# Patient Record
Sex: FEMALE | Race: WHITE | NOT HISPANIC OR LATINO | Employment: FULL TIME | URBAN - METROPOLITAN AREA
[De-identification: names, ages, dates, MRNs, and addresses within clinical notes are randomized per-mention and may not be internally consistent; named-entity substitution may affect disease eponyms.]

---

## 2023-05-24 LAB — COBALAMIN (VITAMIN B12) (PG/ML) IN SER/PLAS: 1280 PG/ML (ref 211–911)

## 2023-05-29 LAB — METHYLMALONIC ACID, S: 0.14 UMOL/L (ref 0–0.4)

## 2023-10-21 ENCOUNTER — APPOINTMENT (OUTPATIENT)
Dept: RADIOLOGY | Facility: CLINIC | Age: 63
End: 2023-10-21

## 2023-12-11 DIAGNOSIS — Z00.6 RESEARCH STUDY PATIENT: Primary | ICD-10-CM

## 2023-12-11 DIAGNOSIS — Z00.6 RESEARCH EXAM: ICD-10-CM

## 2023-12-15 ENCOUNTER — HOSPITAL ENCOUNTER (OUTPATIENT)
Dept: RADIOLOGY | Facility: HOSPITAL | Age: 63
Discharge: HOME | End: 2023-12-15

## 2023-12-15 ENCOUNTER — LAB (OUTPATIENT)
Dept: LAB | Facility: LAB | Age: 63
End: 2023-12-15
Payer: COMMERCIAL

## 2023-12-15 DIAGNOSIS — Z00.6 RESEARCH EXAM: ICD-10-CM

## 2023-12-15 DIAGNOSIS — Z00.6 ENCOUNTER FOR EXAMINATION FOR NORMAL COMPARISON AND CONTROL IN CLINICAL RESEARCH PROGRAM: ICD-10-CM

## 2023-12-15 DIAGNOSIS — Z00.6 RESEARCH STUDY PATIENT: ICD-10-CM

## 2023-12-15 LAB
BASOPHILS # BLD AUTO: 0.02 X10*3/UL (ref 0–0.1)
BASOPHILS NFR BLD AUTO: 0.4 %
CHOLEST SERPL-MCNC: 207 MG/DL (ref 0–199)
CHOLESTEROL/HDL RATIO: 3.2
CRP SERPL HS-MCNC: 25.4 MG/L
EOSINOPHIL # BLD AUTO: 0.08 X10*3/UL (ref 0–0.7)
EOSINOPHIL NFR BLD AUTO: 1.5 %
ERYTHROCYTE [DISTWIDTH] IN BLOOD BY AUTOMATED COUNT: 16.5 % (ref 11.5–14.5)
HCT VFR BLD AUTO: 35.6 % (ref 36–46)
HDLC SERPL-MCNC: 63.7 MG/DL
HGB BLD-MCNC: 10.5 G/DL (ref 12–16)
IMM GRANULOCYTES # BLD AUTO: 0.01 X10*3/UL (ref 0–0.7)
IMM GRANULOCYTES NFR BLD AUTO: 0.2 % (ref 0–0.9)
LDLC SERPL CALC-MCNC: 116 MG/DL
LYMPHOCYTES # BLD AUTO: 0.92 X10*3/UL (ref 1.2–4.8)
LYMPHOCYTES NFR BLD AUTO: 17.4 %
MCH RBC QN AUTO: 24.5 PG (ref 26–34)
MCHC RBC AUTO-ENTMCNC: 29.5 G/DL (ref 32–36)
MCV RBC AUTO: 83 FL (ref 80–100)
MONOCYTES # BLD AUTO: 0.62 X10*3/UL (ref 0.1–1)
MONOCYTES NFR BLD AUTO: 11.7 %
NEUTROPHILS # BLD AUTO: 3.64 X10*3/UL (ref 1.2–7.7)
NEUTROPHILS NFR BLD AUTO: 68.8 %
NON HDL CHOLESTEROL: 143 MG/DL (ref 0–149)
NRBC BLD-RTO: 0 /100 WBCS (ref 0–0)
PLATELET # BLD AUTO: 288 X10*3/UL (ref 150–450)
RBC # BLD AUTO: 4.28 X10*6/UL (ref 4–5.2)
TRIGL SERPL-MCNC: 137 MG/DL (ref 0–149)
VLDL: 27 MG/DL (ref 0–40)
WBC # BLD AUTO: 5.3 X10*3/UL (ref 4.4–11.3)

## 2023-12-15 PROCEDURE — 36415 COLL VENOUS BLD VENIPUNCTURE: CPT

## 2023-12-15 PROCEDURE — 80061 LIPID PANEL: CPT

## 2023-12-15 PROCEDURE — 86141 C-REACTIVE PROTEIN HS: CPT

## 2023-12-15 PROCEDURE — 82610 CYSTATIN C: CPT

## 2023-12-15 PROCEDURE — 85025 COMPLETE CBC W/AUTO DIFF WBC: CPT

## 2023-12-15 PROCEDURE — 71250 CT THORAX DX C-: CPT | Mod: LIO,ONBASE

## 2023-12-15 PROCEDURE — 71250 CT THORAX DX C-: CPT | Mod: LIO | Performed by: RADIOLOGY

## 2023-12-15 PROCEDURE — 70551 MRI BRAIN STEM W/O DYE: CPT | Mod: RSC

## 2023-12-18 LAB
CYSTATIN C SERPL-MCNC: 1.7 MG/L (ref 0.5–1.2)
GFR/BSA.PRED SERPLBLD CYS-BASED-ARV: 36 ML/MIN/BSA

## 2023-12-21 DIAGNOSIS — Z00.6 RESEARCH STUDY PATIENT: ICD-10-CM

## 2024-02-29 DIAGNOSIS — Z00.6 RESEARCH STUDY PATIENT: Primary | ICD-10-CM

## 2024-03-01 ENCOUNTER — APPOINTMENT (OUTPATIENT)
Dept: RADIOLOGY | Facility: HOSPITAL | Age: 64
End: 2024-03-01
Payer: COMMERCIAL

## 2024-03-01 ENCOUNTER — APPOINTMENT (OUTPATIENT)
Dept: OCCUPATIONAL THERAPY | Facility: HOSPITAL | Age: 64
End: 2024-03-01
Payer: COMMERCIAL

## 2024-03-01 ENCOUNTER — LAB (OUTPATIENT)
Dept: LAB | Facility: LAB | Age: 64
End: 2024-03-01
Payer: COMMERCIAL

## 2024-03-01 DIAGNOSIS — Z00.6 RESEARCH STUDY PATIENT: ICD-10-CM

## 2024-03-01 LAB
25(OH)D3 SERPL-MCNC: 36 NG/ML (ref 30–100)
ALBUMIN SERPL BCP-MCNC: 3.9 G/DL (ref 3.4–5)
ALP SERPL-CCNC: 75 U/L (ref 33–136)
ALT SERPL W P-5'-P-CCNC: 11 U/L (ref 7–45)
ANION GAP SERPL CALC-SCNC: 17 MMOL/L (ref 10–20)
APPEARANCE UR: CLEAR
APTT PPP: 33 SECONDS (ref 27–38)
AST SERPL W P-5'-P-CCNC: 13 U/L (ref 9–39)
BASOPHILS # BLD AUTO: 0.02 X10*3/UL (ref 0–0.1)
BASOPHILS NFR BLD AUTO: 0.3 %
BILIRUB DIRECT SERPL-MCNC: 0.1 MG/DL (ref 0–0.3)
BILIRUB SERPL-MCNC: 0.6 MG/DL (ref 0–1.2)
BILIRUB UR STRIP.AUTO-MCNC: NEGATIVE MG/DL
BNP SERPL-MCNC: 50 PG/ML (ref 0–99)
BUN SERPL-MCNC: 18 MG/DL (ref 6–23)
CALCIUM SERPL-MCNC: 9.4 MG/DL (ref 8.6–10.6)
CARDIAC TROPONIN I PNL SERPL HS: 3 NG/L (ref 0–34)
CHLORIDE SERPL-SCNC: 100 MMOL/L (ref 98–107)
CHOLEST SERPL-MCNC: 231 MG/DL (ref 0–199)
CHOLESTEROL/HDL RATIO: 3.1
CK SERPL-CCNC: 27 U/L (ref 0–215)
CO2 SERPL-SCNC: 30 MMOL/L (ref 21–32)
COLOR UR: COLORLESS
CREAT SERPL-MCNC: 0.96 MG/DL (ref 0.5–1.05)
CREAT UR-MCNC: 38.5 MG/DL (ref 20–320)
CRP SERPL HS-MCNC: 31.6 MG/L
EGFRCR SERPLBLD CKD-EPI 2021: 67 ML/MIN/1.73M*2
EOSINOPHIL # BLD AUTO: 0.01 X10*3/UL (ref 0–0.7)
EOSINOPHIL NFR BLD AUTO: 0.2 %
ERYTHROCYTE [DISTWIDTH] IN BLOOD BY AUTOMATED COUNT: 16.5 % (ref 11.5–14.5)
EST. AVERAGE GLUCOSE BLD GHB EST-MCNC: 105 MG/DL
GLUCOSE SERPL-MCNC: 76 MG/DL (ref 74–99)
GLUCOSE UR STRIP.AUTO-MCNC: NORMAL MG/DL
HBA1C MFR BLD: 5.3 %
HCT VFR BLD AUTO: 38.2 % (ref 36–46)
HDLC SERPL-MCNC: 74.4 MG/DL
HGB BLD-MCNC: 10.5 G/DL (ref 12–16)
IMM GRANULOCYTES # BLD AUTO: 0.03 X10*3/UL (ref 0–0.7)
IMM GRANULOCYTES NFR BLD AUTO: 0.5 % (ref 0–0.9)
INR PPP: 1 (ref 0.9–1.1)
KETONES UR STRIP.AUTO-MCNC: NEGATIVE MG/DL
LDLC SERPL CALC-MCNC: 141 MG/DL
LEUKOCYTE ESTERASE UR QL STRIP.AUTO: NEGATIVE
LYMPHOCYTES # BLD AUTO: 0.6 X10*3/UL (ref 1.2–4.8)
LYMPHOCYTES NFR BLD AUTO: 9.2 %
MAGNESIUM SERPL-MCNC: 2.22 MG/DL (ref 1.6–2.4)
MCH RBC QN AUTO: 23.6 PG (ref 26–34)
MCHC RBC AUTO-ENTMCNC: 27.5 G/DL (ref 32–36)
MCV RBC AUTO: 86 FL (ref 80–100)
MONOCYTES # BLD AUTO: 0.51 X10*3/UL (ref 0.1–1)
MONOCYTES NFR BLD AUTO: 7.8 %
NEUTROPHILS # BLD AUTO: 5.38 X10*3/UL (ref 1.2–7.7)
NEUTROPHILS NFR BLD AUTO: 82 %
NITRITE UR QL STRIP.AUTO: NEGATIVE
NON HDL CHOLESTEROL: 157 MG/DL (ref 0–149)
NRBC BLD-RTO: 0 /100 WBCS (ref 0–0)
PH UR STRIP.AUTO: 7 [PH]
PHOSPHATE SERPL-MCNC: 3 MG/DL (ref 2.5–4.9)
PLATELET # BLD AUTO: 344 X10*3/UL (ref 150–450)
POTASSIUM SERPL-SCNC: 4.2 MMOL/L (ref 3.5–5.3)
PROT SERPL-MCNC: 6.4 G/DL (ref 6.4–8.2)
PROT UR STRIP.AUTO-MCNC: NEGATIVE MG/DL
PROT UR-ACNC: <4 MG/DL (ref 5–24)
PROT/CREAT UR: ABNORMAL MG/G{CREAT}
PROTHROMBIN TIME: 11.5 SECONDS (ref 9.8–12.8)
RBC # BLD AUTO: 4.45 X10*6/UL (ref 4–5.2)
RBC # UR STRIP.AUTO: NEGATIVE /UL
SODIUM SERPL-SCNC: 143 MMOL/L (ref 136–145)
SP GR UR STRIP.AUTO: 1.01
T4 FREE SERPL-MCNC: 1.07 NG/DL (ref 0.78–1.48)
TRIGL SERPL-MCNC: 76 MG/DL (ref 0–149)
TSH SERPL-ACNC: 1.75 MIU/L (ref 0.44–3.98)
URATE SERPL-MCNC: 7.4 MG/DL (ref 2.3–6.7)
UROBILINOGEN UR STRIP.AUTO-MCNC: NORMAL MG/DL
VLDL: 15 MG/DL (ref 0–40)
WBC # BLD AUTO: 6.6 X10*3/UL (ref 4.4–11.3)

## 2024-03-01 PROCEDURE — 84156 ASSAY OF PROTEIN URINE: CPT

## 2024-03-01 PROCEDURE — 82570 ASSAY OF URINE CREATININE: CPT

## 2024-03-01 PROCEDURE — 84484 ASSAY OF TROPONIN QUANT: CPT

## 2024-03-01 PROCEDURE — 85610 PROTHROMBIN TIME: CPT

## 2024-03-01 PROCEDURE — 80053 COMPREHEN METABOLIC PANEL: CPT

## 2024-03-01 PROCEDURE — 82306 VITAMIN D 25 HYDROXY: CPT

## 2024-03-01 PROCEDURE — 81003 URINALYSIS AUTO W/O SCOPE: CPT

## 2024-03-01 PROCEDURE — 85025 COMPLETE CBC W/AUTO DIFF WBC: CPT

## 2024-03-01 PROCEDURE — 36415 COLL VENOUS BLD VENIPUNCTURE: CPT

## 2024-03-01 PROCEDURE — 82550 ASSAY OF CK (CPK): CPT

## 2024-03-01 PROCEDURE — 84439 ASSAY OF FREE THYROXINE: CPT

## 2024-03-01 PROCEDURE — 83735 ASSAY OF MAGNESIUM: CPT

## 2024-03-01 PROCEDURE — 85730 THROMBOPLASTIN TIME PARTIAL: CPT

## 2024-03-01 PROCEDURE — 82248 BILIRUBIN DIRECT: CPT

## 2024-03-01 PROCEDURE — 84550 ASSAY OF BLOOD/URIC ACID: CPT

## 2024-03-01 PROCEDURE — 80061 LIPID PANEL: CPT

## 2024-03-01 PROCEDURE — 84443 ASSAY THYROID STIM HORMONE: CPT

## 2024-03-01 PROCEDURE — 86141 C-REACTIVE PROTEIN HS: CPT

## 2024-03-01 PROCEDURE — 82610 CYSTATIN C: CPT

## 2024-03-01 PROCEDURE — 83036 HEMOGLOBIN GLYCOSYLATED A1C: CPT

## 2024-03-01 PROCEDURE — 83880 ASSAY OF NATRIURETIC PEPTIDE: CPT

## 2024-03-01 PROCEDURE — 84100 ASSAY OF PHOSPHORUS: CPT

## 2024-03-04 LAB
CYSTATIN C SERPL-MCNC: 1.8 MG/L (ref 0.5–1.2)
GFR/BSA.PRED SERPLBLD CYS-BASED-ARV: 33 ML/MIN/BSA

## 2024-06-27 ENCOUNTER — HOSPITAL ENCOUNTER (OUTPATIENT)
Dept: CARDIOLOGY | Facility: HOSPITAL | Age: 64
Discharge: HOME | End: 2024-06-27

## 2024-06-27 DIAGNOSIS — Z00.6 RESEARCH STUDY PATIENT: ICD-10-CM

## 2024-06-27 PROCEDURE — 93005 ELECTROCARDIOGRAM TRACING: CPT

## 2024-06-29 LAB
ATRIAL RATE: 70 BPM
P AXIS: 80 DEGREES
P OFFSET: 156 MS
P ONSET: 117 MS
PR INTERVAL: 184 MS
Q ONSET: 209 MS
QRS COUNT: 11 BEATS
QRS DURATION: 94 MS
QT INTERVAL: 426 MS
QTC CALCULATION(BAZETT): 460 MS
QTC FREDERICIA: 448 MS
R AXIS: -32 DEGREES
T AXIS: 28 DEGREES
T OFFSET: 422 MS
VENTRICULAR RATE: 70 BPM

## 2024-08-28 ENCOUNTER — HOSPITAL ENCOUNTER (EMERGENCY)
Facility: HOSPITAL | Age: 64
Discharge: HOME | End: 2024-08-28
Payer: COMMERCIAL

## 2024-08-28 ENCOUNTER — APPOINTMENT (OUTPATIENT)
Dept: RADIOLOGY | Facility: HOSPITAL | Age: 64
End: 2024-08-28
Payer: COMMERCIAL

## 2024-08-28 VITALS
DIASTOLIC BLOOD PRESSURE: 68 MMHG | HEART RATE: 70 BPM | WEIGHT: 293 LBS | RESPIRATION RATE: 18 BRPM | OXYGEN SATURATION: 98 % | BODY MASS INDEX: 50.02 KG/M2 | TEMPERATURE: 97.2 F | SYSTOLIC BLOOD PRESSURE: 132 MMHG | HEIGHT: 64 IN

## 2024-08-28 DIAGNOSIS — R11.0 NAUSEA: ICD-10-CM

## 2024-08-28 DIAGNOSIS — N20.0 KIDNEY STONE: Primary | ICD-10-CM

## 2024-08-28 DIAGNOSIS — N17.9 ACUTE KIDNEY INJURY (CMS-HCC): ICD-10-CM

## 2024-08-28 LAB
ALBUMIN SERPL BCP-MCNC: 3.7 G/DL (ref 3.4–5)
ALP SERPL-CCNC: 64 U/L (ref 33–136)
ALT SERPL W P-5'-P-CCNC: 12 U/L (ref 7–45)
ANION GAP SERPL CALC-SCNC: 14 MMOL/L (ref 10–20)
APPEARANCE UR: CLEAR
AST SERPL W P-5'-P-CCNC: 17 U/L (ref 9–39)
BACTERIA #/AREA URNS AUTO: ABNORMAL /HPF
BASOPHILS # BLD AUTO: 0.02 X10*3/UL (ref 0–0.1)
BASOPHILS NFR BLD AUTO: 0.2 %
BILIRUB SERPL-MCNC: 0.7 MG/DL (ref 0–1.2)
BILIRUB UR STRIP.AUTO-MCNC: NEGATIVE MG/DL
BUN SERPL-MCNC: 25 MG/DL (ref 6–23)
CALCIUM SERPL-MCNC: 9.4 MG/DL (ref 8.6–10.3)
CHLORIDE SERPL-SCNC: 101 MMOL/L (ref 98–107)
CO2 SERPL-SCNC: 28 MMOL/L (ref 21–32)
COLOR UR: ABNORMAL
CREAT SERPL-MCNC: 1.23 MG/DL (ref 0.5–1.05)
EGFRCR SERPLBLD CKD-EPI 2021: 50 ML/MIN/1.73M*2
EOSINOPHIL # BLD AUTO: 0.01 X10*3/UL (ref 0–0.7)
EOSINOPHIL NFR BLD AUTO: 0.1 %
ERYTHROCYTE [DISTWIDTH] IN BLOOD BY AUTOMATED COUNT: 16.7 % (ref 11.5–14.5)
GLUCOSE SERPL-MCNC: 105 MG/DL (ref 74–99)
GLUCOSE UR STRIP.AUTO-MCNC: NORMAL MG/DL
HCT VFR BLD AUTO: 37.2 % (ref 36–46)
HGB BLD-MCNC: 10.8 G/DL (ref 12–16)
IMM GRANULOCYTES # BLD AUTO: 0.07 X10*3/UL (ref 0–0.7)
IMM GRANULOCYTES NFR BLD AUTO: 0.6 % (ref 0–0.9)
KETONES UR STRIP.AUTO-MCNC: NEGATIVE MG/DL
LACTATE SERPL-SCNC: 1.9 MMOL/L (ref 0.4–2)
LEUKOCYTE ESTERASE UR QL STRIP.AUTO: ABNORMAL
LYMPHOCYTES # BLD AUTO: 0.67 X10*3/UL (ref 1.2–4.8)
LYMPHOCYTES NFR BLD AUTO: 6.1 %
MCH RBC QN AUTO: 23 PG (ref 26–34)
MCHC RBC AUTO-ENTMCNC: 29 G/DL (ref 32–36)
MCV RBC AUTO: 79 FL (ref 80–100)
MONOCYTES # BLD AUTO: 0.79 X10*3/UL (ref 0.1–1)
MONOCYTES NFR BLD AUTO: 7.2 %
MUCOUS THREADS #/AREA URNS AUTO: ABNORMAL /LPF
NEUTROPHILS # BLD AUTO: 9.36 X10*3/UL (ref 1.2–7.7)
NEUTROPHILS NFR BLD AUTO: 85.8 %
NITRITE UR QL STRIP.AUTO: NEGATIVE
NRBC BLD-RTO: 0 /100 WBCS (ref 0–0)
PH UR STRIP.AUTO: 5.5 [PH]
PLATELET # BLD AUTO: 347 X10*3/UL (ref 150–450)
POTASSIUM SERPL-SCNC: 3.8 MMOL/L (ref 3.5–5.3)
PROT SERPL-MCNC: 7.3 G/DL (ref 6.4–8.2)
PROT UR STRIP.AUTO-MCNC: NEGATIVE MG/DL
RBC # BLD AUTO: 4.69 X10*6/UL (ref 4–5.2)
RBC # UR STRIP.AUTO: ABNORMAL /UL
RBC #/AREA URNS AUTO: >20 /HPF
SODIUM SERPL-SCNC: 139 MMOL/L (ref 136–145)
SP GR UR STRIP.AUTO: 1.04
SQUAMOUS #/AREA URNS AUTO: ABNORMAL /HPF
UROBILINOGEN UR STRIP.AUTO-MCNC: NORMAL MG/DL
WBC # BLD AUTO: 10.9 X10*3/UL (ref 4.4–11.3)
WBC #/AREA URNS AUTO: ABNORMAL /HPF

## 2024-08-28 PROCEDURE — 96365 THER/PROPH/DIAG IV INF INIT: CPT | Mod: 59

## 2024-08-28 PROCEDURE — 74177 CT ABD & PELVIS W/CONTRAST: CPT

## 2024-08-28 PROCEDURE — 84075 ASSAY ALKALINE PHOSPHATASE: CPT

## 2024-08-28 PROCEDURE — 74177 CT ABD & PELVIS W/CONTRAST: CPT | Mod: FOREIGN READ | Performed by: RADIOLOGY

## 2024-08-28 PROCEDURE — 2500000004 HC RX 250 GENERAL PHARMACY W/ HCPCS (ALT 636 FOR OP/ED)

## 2024-08-28 PROCEDURE — 87086 URINE CULTURE/COLONY COUNT: CPT | Mod: ELYLAB

## 2024-08-28 PROCEDURE — 96361 HYDRATE IV INFUSION ADD-ON: CPT

## 2024-08-28 PROCEDURE — 85025 COMPLETE CBC W/AUTO DIFF WBC: CPT

## 2024-08-28 PROCEDURE — 83605 ASSAY OF LACTIC ACID: CPT

## 2024-08-28 PROCEDURE — 2550000001 HC RX 255 CONTRASTS

## 2024-08-28 PROCEDURE — 96376 TX/PRO/DX INJ SAME DRUG ADON: CPT

## 2024-08-28 PROCEDURE — 96375 TX/PRO/DX INJ NEW DRUG ADDON: CPT

## 2024-08-28 PROCEDURE — 99284 EMERGENCY DEPT VISIT MOD MDM: CPT | Mod: 25

## 2024-08-28 PROCEDURE — 81001 URINALYSIS AUTO W/SCOPE: CPT

## 2024-08-28 RX ORDER — MORPHINE SULFATE 4 MG/ML
4 INJECTION, SOLUTION INTRAMUSCULAR; INTRAVENOUS ONCE
Status: COMPLETED | OUTPATIENT
Start: 2024-08-28 | End: 2024-08-28

## 2024-08-28 RX ORDER — KETOROLAC TROMETHAMINE 30 MG/ML
30 INJECTION, SOLUTION INTRAMUSCULAR; INTRAVENOUS ONCE
Status: COMPLETED | OUTPATIENT
Start: 2024-08-28 | End: 2024-08-28

## 2024-08-28 RX ORDER — ONDANSETRON 4 MG/1
4 TABLET, ORALLY DISINTEGRATING ORAL EVERY 8 HOURS PRN
Qty: 20 TABLET | Refills: 0 | Status: SHIPPED | OUTPATIENT
Start: 2024-08-28 | End: 2024-09-04

## 2024-08-28 RX ORDER — CEPHALEXIN 500 MG/1
500 CAPSULE ORAL 2 TIMES DAILY
Qty: 14 CAPSULE | Refills: 0 | Status: SHIPPED | OUTPATIENT
Start: 2024-08-28 | End: 2024-09-04

## 2024-08-28 RX ORDER — HYDROCODONE BITARTRATE AND ACETAMINOPHEN 5; 325 MG/1; MG/1
1 TABLET ORAL EVERY 6 HOURS PRN
Qty: 20 TABLET | Refills: 0 | Status: SHIPPED | OUTPATIENT
Start: 2024-08-28 | End: 2024-09-02 | Stop reason: HOSPADM

## 2024-08-28 RX ORDER — ONDANSETRON HYDROCHLORIDE 2 MG/ML
4 INJECTION, SOLUTION INTRAVENOUS ONCE
Status: COMPLETED | OUTPATIENT
Start: 2024-08-28 | End: 2024-08-28

## 2024-08-28 RX ORDER — TAMSULOSIN HYDROCHLORIDE 0.4 MG/1
0.4 CAPSULE ORAL DAILY
Qty: 7 CAPSULE | Refills: 0 | Status: SHIPPED | OUTPATIENT
Start: 2024-08-28 | End: 2024-09-04

## 2024-08-28 ASSESSMENT — COLUMBIA-SUICIDE SEVERITY RATING SCALE - C-SSRS
1. IN THE PAST MONTH, HAVE YOU WISHED YOU WERE DEAD OR WISHED YOU COULD GO TO SLEEP AND NOT WAKE UP?: NO
2. HAVE YOU ACTUALLY HAD ANY THOUGHTS OF KILLING YOURSELF?: NO
6. HAVE YOU EVER DONE ANYTHING, STARTED TO DO ANYTHING, OR PREPARED TO DO ANYTHING TO END YOUR LIFE?: NO

## 2024-08-28 ASSESSMENT — LIFESTYLE VARIABLES
TOTAL SCORE: 0
HAVE PEOPLE ANNOYED YOU BY CRITICIZING YOUR DRINKING: NO
EVER FELT BAD OR GUILTY ABOUT YOUR DRINKING: NO
HAVE YOU EVER FELT YOU SHOULD CUT DOWN ON YOUR DRINKING: NO
EVER HAD A DRINK FIRST THING IN THE MORNING TO STEADY YOUR NERVES TO GET RID OF A HANGOVER: NO

## 2024-08-28 ASSESSMENT — PAIN DESCRIPTION - FREQUENCY: FREQUENCY: CONSTANT/CONTINUOUS

## 2024-08-28 ASSESSMENT — PAIN SCALES - GENERAL
PAINLEVEL_OUTOF10: 8
PAINLEVEL_OUTOF10: 5 - MODERATE PAIN

## 2024-08-28 ASSESSMENT — PAIN DESCRIPTION - LOCATION
LOCATION: ABDOMEN
LOCATION: ABDOMEN
LOCATION: BACK
LOCATION: ABDOMEN

## 2024-08-28 ASSESSMENT — PAIN DESCRIPTION - PAIN TYPE: TYPE: ACUTE PAIN

## 2024-08-28 ASSESSMENT — PAIN - FUNCTIONAL ASSESSMENT: PAIN_FUNCTIONAL_ASSESSMENT: 0-10

## 2024-08-28 ASSESSMENT — PAIN DESCRIPTION - ORIENTATION: ORIENTATION: LEFT;LOWER

## 2024-08-28 ASSESSMENT — PAIN DESCRIPTION - DESCRIPTORS: DESCRIPTORS: BURNING;ACHING

## 2024-08-28 NOTE — ED PROVIDER NOTES
"HPI   Chief Complaint   Patient presents with    Abdominal Pain     Left lower abdominal pain x 3 days.       History provided by: Patient    Limitations to history: None    CC: Abdominal pain    HPI: 63-year-old female with a history of rheumatoid arthritis, fibromyalgia, GERD, [place]'s esophagitis presents emergency department to be evaluated for abdominal pain.  Patient states the abdominal pain started yesterday.  She characterized the pain as \"sharp and dull\" and localizes it to the left flank and states that it goes to her left side of her abdomen.  She denies take anything for pain prior to arrival.  Says it feels similar to last time she had kidney stone.  Reports nausea but no vomiting.  Denies urgency frequency dysuria.  Denies blood in the urine or stool but denies diarrhea or constipation.  Denies upper back pain, chest pain, shortness of breath, pleuritic pain or hemoptysis.  She denies history of heart or lung disease including ACS, arrhythmias, heart failure, DVT or PE, asthma or COPD.  Denies weakness and fatigue.  Denies fever and chills.  Denies any injury that would cause her discomfort.  Denies anesthesia, urinary tension, stool incontinence.  Denies history of IV drug use.  Nuys numbness tingling weakness in extremities.  Denies all other systemic symptoms.    ROS: Negative unless mentioned in HPI    Social Hx: Denies tobacco, alcohol, drug use    Medical Hx: Allergy list reviewed.  Immunizations up-to-date.    Physical exam:    Constitutional: Patient is obese and well-developed.  Appears to be uncomfortable but nontoxic in appearance.  Oriented to person, place, time, and situation.    HEENT: Head is normocephalic, atraumatic. Patient's airway is patent.  Tympanic membranes are clear bilaterally.  Nasal mucosa clear.  Mouth with normal mucosa.  Throat is not erythematous and there are no oropharyngeal exudates, uvula is midline.  No obvious facial deformities.    Eyes: Clear bilaterally.  " Pupils are equal round and reactive to light and accommodation.  Extraocular movements intact.      Cardiac: Regular rate, regular rhythm.  Heart sounds S1, S2.  No murmurs, rubs, or gallops.  PMI nondisplaced.  No JVD.    Respiratory: Regular respiratory rate and effort.  Breath sounds are clear and equal bilaterally, no adventitious lung sounds.  Patient is speaking in full sentences and is in no apparent respiratory distress. No use of accessory muscles.      Gastrointestinal: Left-sided abdominal tenderness to palpation.  Abdomen is soft and nondistended.  There are no obvious deformities.  No rebound tenderness or guarding.  Bowel sounds are normal active.    Genitourinary: Left CVA tenderness    Musculoskeletal: No reproducible tenderness.  No obvious skin or bony deformities.  Patient has equal range of motion in all extremities and no strength deficiencies.  No muscle or joint tenderness. No back or neck tenderness.  Capillary refill less than 3 seconds.  Strong peripheral pulses.  No sensory deficits.    Neurological: Patient is alert and oriented.  No focal deficits.  5/5 strength in all extremities.  Cranial nerves II through XII intact. GCS15.     Skin: Skin is normal color for race and is warm, dry, and intact.  No evidence of trauma.  No lesions, rashes, bruising, jaundice, or masses.    Psych: Appropriate mood and affect.  No apparent risk to self or others.    Heme/lymph: No adenopathy, lymphadenopathy, or splenomegaly    Physical exam is otherwise negative unless stated above or in history of present illness.              Patient History   Past Medical History:   Diagnosis Date    Arthritis     COVID-19 06/17/2022    COVID-19 virus infection    GERD (gastroesophageal reflux disease)     Paroxysmal atrial fibrillation (Multi)     Paroxysmal atrial fibrillation    RA (rheumatoid arthritis) (Multi)      Past Surgical History:   Procedure Laterality Date    OTHER SURGICAL HISTORY  06/17/2022     Cholecystectomy    OTHER SURGICAL HISTORY  06/17/2022    Colposcopy    OTHER SURGICAL HISTORY  06/17/2022    Tubal ligation bilateral    OTHER SURGICAL HISTORY  06/17/2022    Complete colonoscopy    OTHER SURGICAL HISTORY  09/27/2022    Bariatric surgery    OTHER SURGICAL HISTORY  09/27/2022    Esophagogastroduodenoscopy     No family history on file.  Social History     Tobacco Use    Smoking status: Never     Passive exposure: Never    Smokeless tobacco: Never   Vaping Use    Vaping status: Never Used   Substance Use Topics    Alcohol use: Defer    Drug use: Never       Physical Exam   ED Triage Vitals [08/28/24 1649]   Temperature Heart Rate Respirations BP   36.2 °C (97.2 °F) 86 20 (!) 194/91      Pulse Ox Temp Source Heart Rate Source Patient Position   97 % Temporal Monitor Sitting      BP Location FiO2 (%)     Right arm --       Physical Exam      ED Course & MDM   Diagnoses as of 08/28/24 2034   Kidney stone   Nausea   Acute kidney injury (CMS-HCC)          Patient updated on plan for lab testing, IV insertion, radiology imaging, and medications to be administered while in the ER (if indicated). Patient updated on expected wait times for testing and results. Patient provided my name and told to ask any staff member for questions or concerns if they should arise. Electronic medical record reviewed.     MDM    Upon initial assessment, patient obese and appears uncomfortable but nontoxic in appearance.    Patient presented to the emergency department with the chief complaint left flank pain.  Left CVA tenderness on exam.  Abdomen is soft and nondistended but she does have reproducible tenderness in the left side.  Breath sounds are clear bilaterally, 97% on room air.  No muffled sounds, JVD, or murmur.  On arrival to the emergency department, vital signs were significant for tension likely due to her acute discomfort.    Based on her history and physical exam my suspicion for ACS, AAA, PE are low.    Will  obtain basic blood work, urinalysis, lactate, CT abdomen pelvis with contrast.  Give the patient Toradol, morphine, Zofran for pain and nausea as well as fluids    CT does confirm a 4 mm mildly obstructing stone in the left proximal ureter with mild hydronephrosis.  Urinalysis reveals blood with 500 leukocyte esterase and 11-20 white blood cells.  There is 1+ bacteria but no nitrates.  CMP reveals a mild acute kidney injury the creatinine of 1.23.  This could be related to the stone however also could be related to some dehydration.  Lactate is 1.9 and CBC shows a microcytic anemia similar to baseline.  I did confirm with Dr. Sherman with urology that this would not be considered an infected stone and that the patient can be safely discharged home as long as she is feeling well and her pain and nausea under control.  Patient does feel comfortable going home at this time.  She will be discharged with Flomax, Norco, Zofran, and Keflex after receiving IV Rocephin here in the emergency department.  I did let her know that I do not want her taking NSAIDs until her kidney function is rechecked by her PCP.  She will follow-up with urology as well.  All questions and concerns addressed.  Reasons to return to ER discussed including fever and chills, worsening pain or nausea.  Patient verbalized understanding and agreement with the treatment plan and they remained hemodynamically stable in the ER.    This note was dictated using a speech recognition program.  While an attempt was made at proof-reading to minimize errors, minor errors in transcription may be present       No data recorded     [place] Coma Scale Score: 15 (08/28/24 1651 : Alok Dubon RN)                           Medical Decision Making      Procedure  Procedures     Hawk Valdez PA-C  08/28/24 2035

## 2024-08-29 LAB — HOLD SPECIMEN: NORMAL

## 2024-08-30 LAB — BACTERIA UR CULT: NORMAL

## 2024-09-01 ENCOUNTER — HOSPITAL ENCOUNTER (OUTPATIENT)
Facility: HOSPITAL | Age: 64
Setting detail: OBSERVATION
End: 2024-09-01
Attending: STUDENT IN AN ORGANIZED HEALTH CARE EDUCATION/TRAINING PROGRAM | Admitting: HOSPITALIST
Payer: COMMERCIAL

## 2024-09-01 ENCOUNTER — APPOINTMENT (OUTPATIENT)
Dept: RADIOLOGY | Facility: HOSPITAL | Age: 64
End: 2024-09-01
Payer: COMMERCIAL

## 2024-09-01 VITALS
DIASTOLIC BLOOD PRESSURE: 59 MMHG | TEMPERATURE: 97.9 F | BODY MASS INDEX: 50.02 KG/M2 | WEIGHT: 293 LBS | HEIGHT: 64 IN | HEART RATE: 84 BPM | OXYGEN SATURATION: 93 % | RESPIRATION RATE: 17 BRPM | SYSTOLIC BLOOD PRESSURE: 130 MMHG

## 2024-09-01 DIAGNOSIS — N20.0 KIDNEY STONES: Primary | ICD-10-CM

## 2024-09-01 LAB
ALBUMIN SERPL BCP-MCNC: 3.6 G/DL (ref 3.4–5)
ALP SERPL-CCNC: 66 U/L (ref 33–136)
ALT SERPL W P-5'-P-CCNC: 14 U/L (ref 7–45)
ANION GAP SERPL CALC-SCNC: 13 MMOL/L (ref 10–20)
APPEARANCE UR: CLEAR
AST SERPL W P-5'-P-CCNC: 15 U/L (ref 9–39)
BASOPHILS # BLD AUTO: 0.03 X10*3/UL (ref 0–0.1)
BASOPHILS NFR BLD AUTO: 0.3 %
BILIRUB SERPL-MCNC: 0.7 MG/DL (ref 0–1.2)
BILIRUB UR STRIP.AUTO-MCNC: NEGATIVE MG/DL
BUN SERPL-MCNC: 22 MG/DL (ref 6–23)
CALCIUM SERPL-MCNC: 9.1 MG/DL (ref 8.6–10.3)
CHLORIDE SERPL-SCNC: 96 MMOL/L (ref 98–107)
CO2 SERPL-SCNC: 30 MMOL/L (ref 21–32)
COLOR UR: YELLOW
CREAT SERPL-MCNC: 2.26 MG/DL (ref 0.5–1.05)
EGFRCR SERPLBLD CKD-EPI 2021: 24 ML/MIN/1.73M*2
EOSINOPHIL # BLD AUTO: 0.08 X10*3/UL (ref 0–0.7)
EOSINOPHIL NFR BLD AUTO: 0.8 %
ERYTHROCYTE [DISTWIDTH] IN BLOOD BY AUTOMATED COUNT: 16.6 % (ref 11.5–14.5)
GLUCOSE SERPL-MCNC: 114 MG/DL (ref 74–99)
GLUCOSE UR STRIP.AUTO-MCNC: NORMAL MG/DL
HCT VFR BLD AUTO: 32.4 % (ref 36–46)
HGB BLD-MCNC: 9.5 G/DL (ref 12–16)
HOLD SPECIMEN: NORMAL
IMM GRANULOCYTES # BLD AUTO: 0.06 X10*3/UL (ref 0–0.7)
IMM GRANULOCYTES NFR BLD AUTO: 0.6 % (ref 0–0.9)
KETONES UR STRIP.AUTO-MCNC: NEGATIVE MG/DL
LEUKOCYTE ESTERASE UR QL STRIP.AUTO: NEGATIVE
LYMPHOCYTES # BLD AUTO: 0.59 X10*3/UL (ref 1.2–4.8)
LYMPHOCYTES NFR BLD AUTO: 6 %
MCH RBC QN AUTO: 23.4 PG (ref 26–34)
MCHC RBC AUTO-ENTMCNC: 29.3 G/DL (ref 32–36)
MCV RBC AUTO: 80 FL (ref 80–100)
MONOCYTES # BLD AUTO: 1.26 X10*3/UL (ref 0.1–1)
MONOCYTES NFR BLD AUTO: 12.8 %
NEUTROPHILS # BLD AUTO: 7.82 X10*3/UL (ref 1.2–7.7)
NEUTROPHILS NFR BLD AUTO: 79.5 %
NITRITE UR QL STRIP.AUTO: NEGATIVE
NRBC BLD-RTO: 0 /100 WBCS (ref 0–0)
PH UR STRIP.AUTO: 5.5 [PH]
PLATELET # BLD AUTO: 290 X10*3/UL (ref 150–450)
POTASSIUM SERPL-SCNC: 3.4 MMOL/L (ref 3.5–5.3)
PROT SERPL-MCNC: 7.4 G/DL (ref 6.4–8.2)
PROT UR STRIP.AUTO-MCNC: NORMAL MG/DL
RBC # BLD AUTO: 4.06 X10*6/UL (ref 4–5.2)
RBC # UR STRIP.AUTO: NEGATIVE /UL
RBC #/AREA URNS AUTO: NORMAL /HPF
SODIUM SERPL-SCNC: 136 MMOL/L (ref 136–145)
SP GR UR STRIP.AUTO: 1.02
SQUAMOUS #/AREA URNS AUTO: NORMAL /HPF
UROBILINOGEN UR STRIP.AUTO-MCNC: NORMAL MG/DL
WBC # BLD AUTO: 9.8 X10*3/UL (ref 4.4–11.3)
WBC #/AREA URNS AUTO: NORMAL /HPF

## 2024-09-01 PROCEDURE — 96375 TX/PRO/DX INJ NEW DRUG ADDON: CPT

## 2024-09-01 PROCEDURE — 74176 CT ABD & PELVIS W/O CONTRAST: CPT | Mod: FOREIGN READ | Performed by: RADIOLOGY

## 2024-09-01 PROCEDURE — 36415 COLL VENOUS BLD VENIPUNCTURE: CPT | Performed by: STUDENT IN AN ORGANIZED HEALTH CARE EDUCATION/TRAINING PROGRAM

## 2024-09-01 PROCEDURE — 96372 THER/PROPH/DIAG INJ SC/IM: CPT | Performed by: HOSPITALIST

## 2024-09-01 PROCEDURE — 96365 THER/PROPH/DIAG IV INF INIT: CPT | Mod: 59

## 2024-09-01 PROCEDURE — 96361 HYDRATE IV INFUSION ADD-ON: CPT

## 2024-09-01 PROCEDURE — 96376 TX/PRO/DX INJ SAME DRUG ADON: CPT

## 2024-09-01 PROCEDURE — 2500000004 HC RX 250 GENERAL PHARMACY W/ HCPCS (ALT 636 FOR OP/ED): Performed by: STUDENT IN AN ORGANIZED HEALTH CARE EDUCATION/TRAINING PROGRAM

## 2024-09-01 PROCEDURE — 99223 1ST HOSP IP/OBS HIGH 75: CPT | Performed by: HOSPITALIST

## 2024-09-01 PROCEDURE — 84075 ASSAY ALKALINE PHOSPHATASE: CPT | Performed by: STUDENT IN AN ORGANIZED HEALTH CARE EDUCATION/TRAINING PROGRAM

## 2024-09-01 PROCEDURE — 96374 THER/PROPH/DIAG INJ IV PUSH: CPT | Mod: 59

## 2024-09-01 PROCEDURE — G0378 HOSPITAL OBSERVATION PER HR: HCPCS

## 2024-09-01 PROCEDURE — 74176 CT ABD & PELVIS W/O CONTRAST: CPT

## 2024-09-01 PROCEDURE — 2500000004 HC RX 250 GENERAL PHARMACY W/ HCPCS (ALT 636 FOR OP/ED): Performed by: HOSPITALIST

## 2024-09-01 PROCEDURE — 85025 COMPLETE CBC W/AUTO DIFF WBC: CPT | Performed by: STUDENT IN AN ORGANIZED HEALTH CARE EDUCATION/TRAINING PROGRAM

## 2024-09-01 PROCEDURE — 2500000002 HC RX 250 W HCPCS SELF ADMINISTERED DRUGS (ALT 637 FOR MEDICARE OP, ALT 636 FOR OP/ED): Performed by: HOSPITALIST

## 2024-09-01 PROCEDURE — 81001 URINALYSIS AUTO W/SCOPE: CPT | Performed by: STUDENT IN AN ORGANIZED HEALTH CARE EDUCATION/TRAINING PROGRAM

## 2024-09-01 PROCEDURE — 99285 EMERGENCY DEPT VISIT HI MDM: CPT

## 2024-09-01 PROCEDURE — 2500000001 HC RX 250 WO HCPCS SELF ADMINISTERED DRUGS (ALT 637 FOR MEDICARE OP): Performed by: STUDENT IN AN ORGANIZED HEALTH CARE EDUCATION/TRAINING PROGRAM

## 2024-09-01 RX ORDER — PANTOPRAZOLE SODIUM 20 MG/1
40 TABLET, DELAYED RELEASE ORAL 2 TIMES DAILY
COMMUNITY

## 2024-09-01 RX ORDER — CEFTRIAXONE 1 G/50ML
1 INJECTION, SOLUTION INTRAVENOUS EVERY 24 HOURS
Status: DISCONTINUED | OUTPATIENT
Start: 2024-09-01 | End: 2024-09-02 | Stop reason: HOSPADM

## 2024-09-01 RX ORDER — MORPHINE SULFATE 2 MG/ML
2 INJECTION, SOLUTION INTRAMUSCULAR; INTRAVENOUS EVERY 4 HOURS PRN
Status: DISCONTINUED | OUTPATIENT
Start: 2024-09-01 | End: 2024-09-02 | Stop reason: HOSPADM

## 2024-09-01 RX ORDER — ONDANSETRON HYDROCHLORIDE 2 MG/ML
4 INJECTION, SOLUTION INTRAVENOUS ONCE
Status: COMPLETED | OUTPATIENT
Start: 2024-09-01 | End: 2024-09-01

## 2024-09-01 RX ORDER — ENOXAPARIN SODIUM 100 MG/ML
60 INJECTION SUBCUTANEOUS EVERY 12 HOURS SCHEDULED
Status: DISCONTINUED | OUTPATIENT
Start: 2024-09-01 | End: 2024-09-02 | Stop reason: HOSPADM

## 2024-09-01 RX ORDER — FUROSEMIDE 20 MG/1
20 TABLET ORAL DAILY
COMMUNITY

## 2024-09-01 RX ORDER — PREDNISONE 10 MG/1
10 TABLET ORAL DAILY
Status: DISCONTINUED | OUTPATIENT
Start: 2024-09-02 | End: 2024-09-02 | Stop reason: HOSPADM

## 2024-09-01 RX ORDER — PREDNISONE 10 MG/1
10 TABLET ORAL DAILY
COMMUNITY

## 2024-09-01 RX ORDER — TAMSULOSIN HYDROCHLORIDE 0.4 MG/1
0.4 CAPSULE ORAL DAILY
Status: DISCONTINUED | OUTPATIENT
Start: 2024-09-01 | End: 2024-09-02 | Stop reason: HOSPADM

## 2024-09-01 RX ORDER — PANTOPRAZOLE SODIUM 40 MG/1
40 TABLET, DELAYED RELEASE ORAL 2 TIMES DAILY
Status: DISCONTINUED | OUTPATIENT
Start: 2024-09-01 | End: 2024-09-02 | Stop reason: HOSPADM

## 2024-09-01 RX ORDER — ACETAMINOPHEN 500 MG
5000 TABLET ORAL DAILY
COMMUNITY

## 2024-09-01 RX ORDER — CHOLECALCIFEROL (VITAMIN D3) 25 MCG
5000 TABLET ORAL DAILY
Status: DISCONTINUED | OUTPATIENT
Start: 2024-09-02 | End: 2024-09-02 | Stop reason: HOSPADM

## 2024-09-01 RX ORDER — FUROSEMIDE 40 MG/1
20 TABLET ORAL DAILY
Status: DISCONTINUED | OUTPATIENT
Start: 2024-09-02 | End: 2024-09-02 | Stop reason: HOSPADM

## 2024-09-01 RX ORDER — MORPHINE SULFATE 4 MG/ML
4 INJECTION, SOLUTION INTRAMUSCULAR; INTRAVENOUS ONCE
Status: COMPLETED | OUTPATIENT
Start: 2024-09-01 | End: 2024-09-01

## 2024-09-01 RX ADMIN — SODIUM CHLORIDE 500 ML: 9 INJECTION, SOLUTION INTRAVENOUS at 04:46

## 2024-09-01 RX ADMIN — HYDROMORPHONE HYDROCHLORIDE 0.5 MG: 1 INJECTION, SOLUTION INTRAMUSCULAR; INTRAVENOUS; SUBCUTANEOUS at 04:47

## 2024-09-01 RX ADMIN — MORPHINE SULFATE 2 MG: 2 INJECTION, SOLUTION INTRAMUSCULAR; INTRAVENOUS at 13:43

## 2024-09-01 RX ADMIN — MORPHINE SULFATE 4 MG: 4 INJECTION, SOLUTION INTRAMUSCULAR; INTRAVENOUS at 07:42

## 2024-09-01 RX ADMIN — ENOXAPARIN SODIUM 60 MG: 60 INJECTION SUBCUTANEOUS at 13:43

## 2024-09-01 RX ADMIN — MORPHINE SULFATE 2 MG: 2 INJECTION, SOLUTION INTRAMUSCULAR; INTRAVENOUS at 22:23

## 2024-09-01 RX ADMIN — MORPHINE SULFATE 2 MG: 2 INJECTION, SOLUTION INTRAMUSCULAR; INTRAVENOUS at 18:14

## 2024-09-01 RX ADMIN — ONDANSETRON 4 MG: 2 INJECTION INTRAMUSCULAR; INTRAVENOUS at 04:47

## 2024-09-01 RX ADMIN — PANTOPRAZOLE SODIUM 40 MG: 40 TABLET, DELAYED RELEASE ORAL at 22:23

## 2024-09-01 RX ADMIN — CEFTRIAXONE SODIUM 1 G: 1 INJECTION, SOLUTION INTRAVENOUS at 14:55

## 2024-09-01 RX ADMIN — ONDANSETRON 4 MG: 2 INJECTION INTRAMUSCULAR; INTRAVENOUS at 07:38

## 2024-09-01 RX ADMIN — TAMSULOSIN HYDROCHLORIDE 0.4 MG: 0.4 CAPSULE ORAL at 13:43

## 2024-09-01 SDOH — SOCIAL STABILITY: SOCIAL INSECURITY: DO YOU FEEL ANYONE HAS EXPLOITED OR TAKEN ADVANTAGE OF YOU FINANCIALLY OR OF YOUR PERSONAL PROPERTY?: NO

## 2024-09-01 SDOH — ECONOMIC STABILITY: HOUSING INSECURITY: AT ANY TIME IN THE PAST 12 MONTHS, WERE YOU HOMELESS OR LIVING IN A SHELTER (INCLUDING NOW)?: NO

## 2024-09-01 SDOH — HEALTH STABILITY: MENTAL HEALTH
HOW OFTEN DO YOU NEED TO HAVE SOMEONE HELP YOU WHEN YOU READ INSTRUCTIONS, PAMPHLETS, OR OTHER WRITTEN MATERIAL FROM YOUR DOCTOR OR PHARMACY?: NEVER

## 2024-09-01 SDOH — HEALTH STABILITY: PHYSICAL HEALTH: ON AVERAGE, HOW MANY MINUTES DO YOU ENGAGE IN EXERCISE AT THIS LEVEL?: 30 MIN

## 2024-09-01 SDOH — HEALTH STABILITY: MENTAL HEALTH: HOW OFTEN DO YOU HAVE 6 OR MORE DRINKS ON ONE OCCASION?: NEVER

## 2024-09-01 SDOH — HEALTH STABILITY: MENTAL HEALTH: HOW OFTEN DO YOU HAVE A DRINK CONTAINING ALCOHOL?: NEVER

## 2024-09-01 SDOH — ECONOMIC STABILITY: HOUSING INSECURITY: IN THE PAST 12 MONTHS, HOW MANY TIMES HAVE YOU MOVED WHERE YOU WERE LIVING?: 1

## 2024-09-01 SDOH — ECONOMIC STABILITY: INCOME INSECURITY: IN THE PAST 12 MONTHS, HAS THE ELECTRIC, GAS, OIL, OR WATER COMPANY THREATENED TO SHUT OFF SERVICE IN YOUR HOME?: NO

## 2024-09-01 SDOH — ECONOMIC STABILITY: INCOME INSECURITY: HOW HARD IS IT FOR YOU TO PAY FOR THE VERY BASICS LIKE FOOD, HOUSING, MEDICAL CARE, AND HEATING?: NOT HARD AT ALL

## 2024-09-01 SDOH — SOCIAL STABILITY: SOCIAL INSECURITY: HAVE YOU HAD ANY THOUGHTS OF HARMING ANYONE ELSE?: NO

## 2024-09-01 SDOH — ECONOMIC STABILITY: INCOME INSECURITY: IN THE LAST 12 MONTHS, WAS THERE A TIME WHEN YOU WERE NOT ABLE TO PAY THE MORTGAGE OR RENT ON TIME?: NO

## 2024-09-01 SDOH — SOCIAL STABILITY: SOCIAL INSECURITY: HAVE YOU HAD THOUGHTS OF HARMING ANYONE ELSE?: NO

## 2024-09-01 SDOH — SOCIAL STABILITY: SOCIAL INSECURITY: DO YOU FEEL UNSAFE GOING BACK TO THE PLACE WHERE YOU ARE LIVING?: NO

## 2024-09-01 SDOH — SOCIAL STABILITY: SOCIAL INSECURITY: WERE YOU ABLE TO COMPLETE ALL THE BEHAVIORAL HEALTH SCREENINGS?: YES

## 2024-09-01 SDOH — HEALTH STABILITY: MENTAL HEALTH: HOW MANY STANDARD DRINKS CONTAINING ALCOHOL DO YOU HAVE ON A TYPICAL DAY?: PATIENT DOES NOT DRINK

## 2024-09-01 SDOH — SOCIAL STABILITY: SOCIAL INSECURITY: ARE YOU OR HAVE YOU BEEN THREATENED OR ABUSED PHYSICALLY, EMOTIONALLY, OR SEXUALLY BY ANYONE?: NO

## 2024-09-01 SDOH — SOCIAL STABILITY: SOCIAL INSECURITY: ARE THERE ANY APPARENT SIGNS OF INJURIES/BEHAVIORS THAT COULD BE RELATED TO ABUSE/NEGLECT?: NO

## 2024-09-01 SDOH — HEALTH STABILITY: PHYSICAL HEALTH: ON AVERAGE, HOW MANY DAYS PER WEEK DO YOU ENGAGE IN MODERATE TO STRENUOUS EXERCISE (LIKE A BRISK WALK)?: 4 DAYS

## 2024-09-01 SDOH — SOCIAL STABILITY: SOCIAL INSECURITY: DOES ANYONE TRY TO KEEP YOU FROM HAVING/CONTACTING OTHER FRIENDS OR DOING THINGS OUTSIDE YOUR HOME?: NO

## 2024-09-01 SDOH — ECONOMIC STABILITY: TRANSPORTATION INSECURITY
IN THE PAST 12 MONTHS, HAS LACK OF TRANSPORTATION KEPT YOU FROM MEETINGS, WORK, OR FROM GETTING THINGS NEEDED FOR DAILY LIVING?: NO

## 2024-09-01 SDOH — ECONOMIC STABILITY: TRANSPORTATION INSECURITY
IN THE PAST 12 MONTHS, HAS THE LACK OF TRANSPORTATION KEPT YOU FROM MEDICAL APPOINTMENTS OR FROM GETTING MEDICATIONS?: NO

## 2024-09-01 SDOH — SOCIAL STABILITY: SOCIAL INSECURITY: HAS ANYONE EVER THREATENED TO HURT YOUR FAMILY OR YOUR PETS?: NO

## 2024-09-01 SDOH — SOCIAL STABILITY: SOCIAL INSECURITY: ABUSE: ADULT

## 2024-09-01 ASSESSMENT — PATIENT HEALTH QUESTIONNAIRE - PHQ9
2. FEELING DOWN, DEPRESSED OR HOPELESS: NOT AT ALL
1. LITTLE INTEREST OR PLEASURE IN DOING THINGS: NOT AT ALL
SUM OF ALL RESPONSES TO PHQ9 QUESTIONS 1 & 2: 0

## 2024-09-01 ASSESSMENT — PAIN - FUNCTIONAL ASSESSMENT
PAIN_FUNCTIONAL_ASSESSMENT: 0-10

## 2024-09-01 ASSESSMENT — LIFESTYLE VARIABLES
HOW OFTEN DO YOU HAVE 6 OR MORE DRINKS ON ONE OCCASION: NEVER
AUDIT-C TOTAL SCORE: 0
SKIP TO QUESTIONS 9-10: 1
HOW MANY STANDARD DRINKS CONTAINING ALCOHOL DO YOU HAVE ON A TYPICAL DAY: PATIENT DOES NOT DRINK
SKIP TO QUESTIONS 9-10: 1
SKIP TO QUESTIONS 9-10: 1
AUDIT-C TOTAL SCORE: 0
HOW OFTEN DO YOU HAVE A DRINK CONTAINING ALCOHOL: NEVER

## 2024-09-01 ASSESSMENT — COGNITIVE AND FUNCTIONAL STATUS - GENERAL
PATIENT BASELINE BEDBOUND: NO
MOBILITY SCORE: 24
DAILY ACTIVITIY SCORE: 24

## 2024-09-01 ASSESSMENT — PAIN DESCRIPTION - PAIN TYPE
TYPE: ACUTE PAIN
TYPE: ACUTE PAIN

## 2024-09-01 ASSESSMENT — ACTIVITIES OF DAILY LIVING (ADL)
TOILETING: INDEPENDENT
BATHING: INDEPENDENT
FEEDING YOURSELF: INDEPENDENT
DRESSING YOURSELF: INDEPENDENT
PATIENT'S MEMORY ADEQUATE TO SAFELY COMPLETE DAILY ACTIVITIES?: YES
GROOMING: INDEPENDENT
WALKS IN HOME: INDEPENDENT
ADEQUATE_TO_COMPLETE_ADL: NO
JUDGMENT_ADEQUATE_SAFELY_COMPLETE_DAILY_ACTIVITIES: YES
HEARING - LEFT EAR: FUNCTIONAL
HEARING - RIGHT EAR: FUNCTIONAL

## 2024-09-01 ASSESSMENT — PAIN SCALES - GENERAL
PAINLEVEL_OUTOF10: 8
PAINLEVEL_OUTOF10: 2
PAINLEVEL_OUTOF10: 4
PAINLEVEL_OUTOF10: 2
PAINLEVEL_OUTOF10: 4
PAINLEVEL_OUTOF10: 6
PAINLEVEL_OUTOF10: 4
PAINLEVEL_OUTOF10: 8
PAINLEVEL_OUTOF10: 3
PAINLEVEL_OUTOF10: 8
PAINLEVEL_OUTOF10: 8
PAINLEVEL_OUTOF10: 7

## 2024-09-01 ASSESSMENT — COLUMBIA-SUICIDE SEVERITY RATING SCALE - C-SSRS
1. IN THE PAST MONTH, HAVE YOU WISHED YOU WERE DEAD OR WISHED YOU COULD GO TO SLEEP AND NOT WAKE UP?: NO
6. HAVE YOU EVER DONE ANYTHING, STARTED TO DO ANYTHING, OR PREPARED TO DO ANYTHING TO END YOUR LIFE?: NO
2. HAVE YOU ACTUALLY HAD ANY THOUGHTS OF KILLING YOURSELF?: NO

## 2024-09-01 ASSESSMENT — PAIN DESCRIPTION - PROGRESSION: CLINICAL_PROGRESSION: GRADUALLY IMPROVING

## 2024-09-01 ASSESSMENT — PAIN DESCRIPTION - ORIENTATION
ORIENTATION: LEFT

## 2024-09-01 ASSESSMENT — PAIN DESCRIPTION - DESCRIPTORS: DESCRIPTORS: PATIENT UNABLE TO DESCRIBE

## 2024-09-01 ASSESSMENT — PAIN DESCRIPTION - LOCATION
LOCATION: ABDOMEN
LOCATION: ABDOMEN
LOCATION: BACK
LOCATION: ABDOMEN
LOCATION: ABDOMEN

## 2024-09-01 ASSESSMENT — PAIN DESCRIPTION - DIRECTION: RADIATING_TOWARDS: LEFT FLANK

## 2024-09-01 ASSESSMENT — PAIN DESCRIPTION - FREQUENCY: FREQUENCY: CONSTANT/CONTINUOUS

## 2024-09-01 NOTE — ED PROVIDER NOTES
HPI   Chief Complaint   Patient presents with    Abdominal Pain     Left lower abdomen all the way to my back hurts, I was just here Wednesday and have kidney stones, the medicine isn't helping       63-year-old female presenting for evaluation of worsening left-sided flank pain.  Was recently seen and evaluated in the ED and diagnosed with left obstructing nephrolithiasis.      History provided by:  Patient and medical records          Patient History   Past Medical History:   Diagnosis Date    Arthritis     COVID-19 06/17/2022    COVID-19 virus infection    GERD (gastroesophageal reflux disease)     Paroxysmal atrial fibrillation (Multi)     Paroxysmal atrial fibrillation    RA (rheumatoid arthritis) (Multi)      Past Surgical History:   Procedure Laterality Date    OTHER SURGICAL HISTORY  06/17/2022    Cholecystectomy    OTHER SURGICAL HISTORY  06/17/2022    Colposcopy    OTHER SURGICAL HISTORY  06/17/2022    Tubal ligation bilateral    OTHER SURGICAL HISTORY  06/17/2022    Complete colonoscopy    OTHER SURGICAL HISTORY  09/27/2022    Bariatric surgery    OTHER SURGICAL HISTORY  09/27/2022    Esophagogastroduodenoscopy     No family history on file.  Social History     Tobacco Use    Smoking status: Never     Passive exposure: Never    Smokeless tobacco: Never   Vaping Use    Vaping status: Never Used   Substance Use Topics    Alcohol use: Defer    Drug use: Never       Physical Exam   ED Triage Vitals [09/01/24 0249]   Temperature Heart Rate Respirations BP   36.9 °C (98.4 °F) 94 18 142/76      Pulse Ox Temp Source Heart Rate Source Patient Position   96 % Temporal Monitor Sitting      BP Location FiO2 (%)     Right arm --       Physical Exam  Vitals and nursing note reviewed.   Constitutional:       General: She is not in acute distress.     Appearance: Normal appearance. She is not ill-appearing.   HENT:      Head: Atraumatic.   Eyes:      Conjunctiva/sclera: Conjunctivae normal.   Cardiovascular:      Rate  and Rhythm: Normal rate.   Pulmonary:      Effort: Pulmonary effort is normal. No respiratory distress.   Abdominal:      General: There is no distension.      Palpations: Abdomen is soft.      Tenderness: There is no abdominal tenderness.   Musculoskeletal:         General: No deformity.      Cervical back: Normal range of motion.   Skin:     Findings: No rash.   Neurological:      Mental Status: She is alert. Mental status is at baseline.   Psychiatric:         Behavior: Behavior normal.           ED Course & MDM   Diagnoses as of 09/01/24 1916   Kidney stones                 No data recorded     [place] Coma Scale Score: 15 (09/01/24 0249 : Rhonda Hale RN)                   Labs Reviewed   CBC WITH AUTO DIFFERENTIAL - Abnormal       Result Value    WBC 9.8      nRBC 0.0      RBC 4.06      Hemoglobin 9.5 (*)     Hematocrit 32.4 (*)     MCV 80      MCH 23.4 (*)     MCHC 29.3 (*)     RDW 16.6 (*)     Platelets 290      Neutrophils % 79.5      Immature Granulocytes %, Automated 0.6      Lymphocytes % 6.0      Monocytes % 12.8      Eosinophils % 0.8      Basophils % 0.3      Neutrophils Absolute 7.82 (*)     Immature Granulocytes Absolute, Automated 0.06      Lymphocytes Absolute 0.59 (*)     Monocytes Absolute 1.26 (*)     Eosinophils Absolute 0.08      Basophils Absolute 0.03     COMPREHENSIVE METABOLIC PANEL - Abnormal    Glucose 114 (*)     Sodium 136      Potassium 3.4 (*)     Chloride 96 (*)     Bicarbonate 30      Anion Gap 13      Urea Nitrogen 22      Creatinine 2.26 (*)     eGFR 24 (*)     Calcium 9.1      Albumin 3.6      Alkaline Phosphatase 66      Total Protein 7.4      AST 15      Bilirubin, Total 0.7      ALT 14     URINALYSIS WITH REFLEX CULTURE AND MICROSCOPIC - Normal    Color, Urine Yellow      Appearance, Urine Clear      Specific Gravity, Urine 1.023      pH, Urine 5.5      Protein, Urine 10 (TRACE)      Glucose, Urine Normal      Blood, Urine NEGATIVE      Ketones, Urine NEGATIVE       Bilirubin, Urine NEGATIVE      Urobilinogen, Urine Normal      Nitrite, Urine NEGATIVE      Leukocyte Esterase, Urine NEGATIVE     URINALYSIS WITH REFLEX CULTURE AND MICROSCOPIC    Narrative:     The following orders were created for panel order Urinalysis with Reflex Culture and Microscopic.  Procedure                               Abnormality         Status                     ---------                               -----------         ------                     Urinalysis with Reflex C...[[id number]]  Normal              Final result               Extra Urine Gray Tube[[id number]]                            Final result                 Please view results for these tests on the individual orders.   EXTRA URINE GRAY TUBE    Extra Tube Hold for add-ons.     MAGNESIUM   BASIC METABOLIC PANEL   CBC   URINALYSIS MICROSCOPIC WITH REFLEX CULTURE    WBC, Urine 1-5      RBC, Urine 1-2      Squamous Epithelial Cells, Urine 1-9 (SPARSE)               Medical Decision Making  63-year-old female recently diagnosed with obstructing left nephrolithiasis presenting with worsening pain, nausea and vomiting.  Repeat lab work and urinalysis obtained to rule out infection, electrolyte derangements.  Urinalysis without evidence of infection.  Patient's labs reviewed demonstrating worsening renal function.  Patient was given IV fluid bolus, Zofran for symptomatic relief of nausea and vomiting and hydromorphone IV.  Patient continued to have pain requiring repeat dosing of morphine.  Signed over to oncoming physician pending results of CT imaging to evaluate for complications related to left nephrolithiasis.        Procedure  Procedures     Froylan Nuñez MD  09/01/24 1918

## 2024-09-01 NOTE — CARE PLAN
Problem: Pain  Goal: Takes deep breaths with improved pain control throughout the shift  Outcome: Progressing  Goal: Turns in bed with improved pain control throughout the shift  Outcome: Progressing  Goal: Walks with improved pain control throughout the shift  Outcome: Progressing  Goal: Performs ADL's with improved pain control throughout shift  Outcome: Progressing  Goal: Participates in PT with improved pain control throughout the shift  Outcome: Progressing  Goal: Free from opioid side effects throughout the shift  Outcome: Progressing  Goal: Free from acute confusion related to pain meds throughout the shift  Outcome: Progressing   The patient's goals for the shift include      The clinical goals for the shift include To manage pain

## 2024-09-01 NOTE — PROGRESS NOTES
Emergency Medicine Transition of Care Note.    I received Kristy Correa in signout from Dr. Nuñez.  Please see the previous ED provider note for all HPI, PE and MDM up to the time of signout at 0700. This is in addition to the primary record.    In brief Kristy Correa is an 63 y.o. female presenting for   Chief Complaint   Patient presents with    Abdominal Pain     Left lower abdomen all the way to my back hurts, I was just here Wednesday and have kidney stones, the medicine isn't helping     At the time of signout we were awaiting: CT scan    Diagnoses as of 09/01/24 1043   Kidney stones       Medical Decision Making      Final diagnoses:   [N20.0] Kidney stones     63-year-old female endorsed over to me by the previous emergency physician pending CT scan for kidney stones.  Patient was here on Wednesday diagnosed with kidney stone and sent home with Keflex, Norco and Flomax.  She states that the pain just is not getting any better and she thinks it might be getting worse.  She denies any fevers or chills.  She denies any chest pain.  Urinalysis does not show infection.  Creatinine creatinine however has gone from 1.2-2.3.  Patient was given IV fluids.  She was given IV pain medication.  I spoke to Dr. Sherman as the CT scan obtained showed that the 5 mm proximal stone is unchanged.  She also has a distal stone as well.  There is moderate hydronephrosis.  Per Dr. Sherman the patient should be admitted to the hospitalist and his plan is to take her for surgery possibly tomorrow morning.  She is to be n.p.o. after midnight.  She will be hydrated.  She will need pain control.  I spoke to Dr. Byrd for Dr. Rodriguez patient will be admitted.      Procedure  Procedures    Ratna De La Rosa MD

## 2024-09-01 NOTE — H&P
History and Physical        ASSESSMENT AND PLAN:     5 mm obstructive kidney stone  Left-sided hydronephrosis  -P/W abdominal pain  -CT abdomen and pelvis shows a 5 mm left kidney stone with moderate hydroureteronephrosis  -Continue pain control  -IV fluids  -IV ceftriaxone  -N.p.o., urology consulted    Morbid obesity          VTE Prophylaxis: Lovenox held for urologic intervention      Aminta Mckenzie MD    HISTORY OF PRESENT ILLNESS:   Chief Complaint: Flank pain    History Of Present Illness:    Kristy Correa is a 63 y.o. female with a significant past medical history of rheumatoid arthritis, fibromyalgia who presented to the emergency room with abdominal flank pain.    She was in the emergency room on August 28 for a kidney stone.  CT abdomen pelvis showed a 5 mm stone of the upper left ureter.  She was discharged on antibiotics, Percocet.  However she states that her pain was significant which brought her back to the emergency room.    She is currently complaining of severe 10 out of 10 pain, she denies fevers, chills, denies chest pain, shortness of breath.       Review of systems: 10 point review of systems is otherwise negative except as mentioned above.    PAST HISTORIES:       Past Medical History:  She has a past medical history of Arthritis, COVID-19 (06/17/2022), GERD (gastroesophageal reflux disease), Paroxysmal atrial fibrillation (Multi), and RA (rheumatoid arthritis) (Multi).    Past Surgical History:  She has a past surgical history that includes Other surgical history (06/17/2022); Other surgical history (06/17/2022); Other surgical history (06/17/2022); Other surgical history (06/17/2022); Other surgical history (09/27/2022); and Other surgical history (09/27/2022).      Social History:  She reports that she has never smoked. She has never been exposed to tobacco smoke. She has never used smokeless tobacco. Alcohol use questions deferred to the physician. She reports that she does  not use drugs.    Family History:  No family history on file.     Allergies:  Hydrocortisone, Fexofenadine, Methylprednisolone, Tetanus vaccines and toxoid, and Azithromycin    OBJECTIVE:       Last Recorded Vitals:  Vitals:    09/01/24 0736 09/01/24 0849 09/01/24 1220 09/01/24 1340   BP: 148/71 132/63 136/70 135/66   BP Location: Left arm Left arm Left arm Left arm   Patient Position: Sitting Sitting Sitting Sitting   Pulse: 76 74 72 95   Resp: 16 16 17    Temp:    35.7 °C (96.3 °F)   TempSrc:    Temporal   SpO2: 96% 95% 97% 95%   Weight:       Height:           Last I/O:  No intake/output data recorded.    Physical Exam    PHYSICAL EXAM:   GENERAL: Laying in bed, does not appear to be in any distress.   HEENT: HEAD: Normocephalic atraumatic.  Neck: Supple.  Eyes: Pupils are reactive to direct light.   CVS: S1, S2 heard. Regular rate and rhythm  LUNGS: Clear to auscultate bilaterally. No wheezing or rhonchi appreciated.  ABDOMEN: Soft, nontender to palpate. Positive bowel sounds. No guarding or rebound appreciated.  NEUROLOGICAL: No focal neurological deficits appreciated. Cranial nerves are grossly intact.  EXTREMITIES:  No edema appreciated.  SKIN:  Grossly intact, warm and dry.    Scheduled Medications  cefTRIAXone, 1 g, intravenous, q24h  [Held by provider] enoxaparin, 60 mg, subcutaneous, q12h ISREAL  tamsulosin, 0.4 mg, oral, Daily      PRN Medications  PRN medications: morphine  Continuous Medications       Outpatient Medications:  Prior to Admission medications    Medication Sig Start Date End Date Taking? Authorizing Provider   B complex-vitamin C-folic acid (Nephro-La Rx) 1- mg-mg-mcg tablet Take 1 tablet by mouth once daily with breakfast.   Yes Historical Provider, MD   cephalexin (Keflex) 500 mg capsule Take 1 capsule (500 mg) by mouth 2 times a day for 7 days. 8/28/24 9/4/24 Yes Hawk Valdez PA-C   furosemide (Lasix) 20 mg tablet Take 1 tablet (20 mg) by mouth once daily.   Yes Historical  Provider, MD   ondansetron ODT (Zofran-ODT) 4 mg disintegrating tablet Take 1 tablet (4 mg) by mouth every 8 hours if needed for nausea or vomiting for up to 7 days. 8/28/24 9/4/24 Yes Hawk Valdez PA-C   pantoprazole (ProtoNix) 20 mg EC tablet Take 2 tablets (40 mg) by mouth 2 times a day. Do not crush, chew, or split.   Yes Historical Provider, MD   predniSONE (Deltasone) 10 mg tablet Take 1 tablet (10 mg) by mouth once daily.   Yes Historical Provider, MD   tamsulosin (Flomax) 0.4 mg 24 hr capsule Take 1 capsule (0.4 mg) by mouth once daily for 7 days. 8/28/24 9/4/24 Yes Hawk Valdez PA-C   cholecalciferol (Vitamin D-3) 5,000 Units tablet Take 1 tablet (5,000 Units) by mouth once daily.    Historical Provider, MD   HYDROcodone-acetaminophen (Norco) 5-325 mg tablet Take 1 tablet by mouth every 6 hours if needed for severe pain (7 - 10) for up to 3 days. 8/28/24 8/31/24  Hawk Valdez PA-C       LABS AND IMAGING:     Labs:  Results for orders placed or performed during the hospital encounter of 09/01/24 (from the past 24 hour(s))   CBC and Auto Differential   Result Value Ref Range    WBC 9.8 4.4 - 11.3 x10*3/uL    nRBC 0.0 0.0 - 0.0 /100 WBCs    RBC 4.06 4.00 - 5.20 x10*6/uL    Hemoglobin 9.5 (L) 12.0 - 16.0 g/dL    Hematocrit 32.4 (L) 36.0 - 46.0 %    MCV 80 80 - 100 fL    MCH 23.4 (L) 26.0 - 34.0 pg    MCHC 29.3 (L) 32.0 - 36.0 g/dL    RDW 16.6 (H) 11.5 - 14.5 %    Platelets 290 150 - 450 x10*3/uL    Neutrophils % 79.5 40.0 - 80.0 %    Immature Granulocytes %, Automated 0.6 0.0 - 0.9 %    Lymphocytes % 6.0 13.0 - 44.0 %    Monocytes % 12.8 2.0 - 10.0 %    Eosinophils % 0.8 0.0 - 6.0 %    Basophils % 0.3 0.0 - 2.0 %    Neutrophils Absolute 7.82 (H) 1.20 - 7.70 x10*3/uL    Immature Granulocytes Absolute, Automated 0.06 0.00 - 0.70 x10*3/uL    Lymphocytes Absolute 0.59 (L) 1.20 - 4.80 x10*3/uL    Monocytes Absolute 1.26 (H) 0.10 - 1.00 x10*3/uL    Eosinophils Absolute 0.08 0.00 - 0.70 x10*3/uL    Basophils  Absolute 0.03 0.00 - 0.10 x10*3/uL   Comprehensive metabolic panel   Result Value Ref Range    Glucose 114 (H) 74 - 99 mg/dL    Sodium 136 136 - 145 mmol/L    Potassium 3.4 (L) 3.5 - 5.3 mmol/L    Chloride 96 (L) 98 - 107 mmol/L    Bicarbonate 30 21 - 32 mmol/L    Anion Gap 13 10 - 20 mmol/L    Urea Nitrogen 22 6 - 23 mg/dL    Creatinine 2.26 (H) 0.50 - 1.05 mg/dL    eGFR 24 (L) >60 mL/min/1.73m*2    Calcium 9.1 8.6 - 10.3 mg/dL    Albumin 3.6 3.4 - 5.0 g/dL    Alkaline Phosphatase 66 33 - 136 U/L    Total Protein 7.4 6.4 - 8.2 g/dL    AST 15 9 - 39 U/L    Bilirubin, Total 0.7 0.0 - 1.2 mg/dL    ALT 14 7 - 45 U/L   Urinalysis with Reflex Culture and Microscopic   Result Value Ref Range    Color, Urine Yellow Light-Yellow, Yellow, Dark-Yellow    Appearance, Urine Clear Clear    Specific Gravity, Urine 1.023 1.005 - 1.035    pH, Urine 5.5 5.0, 5.5, 6.0, 6.5, 7.0, 7.5, 8.0    Protein, Urine 10 (TRACE) NEGATIVE, 10 (TRACE), 20 (TRACE) mg/dL    Glucose, Urine Normal Normal mg/dL    Blood, Urine NEGATIVE NEGATIVE    Ketones, Urine NEGATIVE NEGATIVE mg/dL    Bilirubin, Urine NEGATIVE NEGATIVE    Urobilinogen, Urine Normal Normal mg/dL    Nitrite, Urine NEGATIVE NEGATIVE    Leukocyte Esterase, Urine NEGATIVE NEGATIVE   Urinalysis Microscopic   Result Value Ref Range    WBC, Urine 1-5 1-5, NONE /HPF    RBC, Urine 1-2 NONE, 1-2, 3-5 /HPF    Squamous Epithelial Cells, Urine 1-9 (SPARSE) Reference range not established. /HPF        Imaging:  CT abdomen pelvis wo IV contrast  Narrative: STUDY:  CT Abdomen and Pelvis without IV Contrast; 9/1/2024 6:36 AM.  INDICATION:  Recent diagnosis of kidney stone.  Worsening flank pain and vomiting.  COMPARISON:  CT abdomen pelvis 8/28/2024.  US pelvis 10/6/2022.  ACCESSION NUMBER(S):  [id number]  ORDERING CLINICIAN:  LORAINE BERNADINE  TECHNIQUE:  CT of the abdomen and pelvis was performed.  Contiguous axial images  were obtained at 3 mm slice thickness through the abdomen and pelvis.    Coronal and sagittal reconstructions at 3 mm slice thickness were  performed. No intravenous contrast was administered.    Automated mA/kV exposure control was utilized and patient examination  was performed in strict accordance with principles of ALARA.  FINDINGS:  Please note that the evaluation of vessels, lymph nodes and organs is  limited without intravenous contrast.      LOWER CHEST:  No cardiomegaly.  No pericardial effusion.  Lung bases are clear.     ABDOMEN:     LIVER:  No hepatomegaly.  Smooth surface contour.  Normal attenuation.     BILE DUCTS:  No intrahepatic or extrahepatic biliary ductal dilatation.     GALLBLADDER:  Gallbladder is surgically absent.  STOMACH:  The patient appears to be status post gastric bypass surgery.  Hiatal  hernia noted.  The appearance is unchanged from the CT scan of  8/28/2024.       PANCREAS:  No masses or ductal dilatation.     SPLEEN:  The spleen is mildly enlarged measuring 14 cm in AP dimension.  No  focal splenic lesion.     ADRENAL GLANDS:  No thickening or nodules.     KIDNEYS AND URETERS:  Kidneys are normal in size and location.  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  A 5 mm stone of the upper left ureter, at or  just beyond the UPJ, is unchanged.  A 3 mm stone of the left renal  pelvis is unchanged.  There are several other left-sided calyceal  stones.  There is a cluster of stones of the left lower pole.  The  cluster measures 1.5 cm in greatest dimension.  This measurement is  obtained on the coronal images.  There is moderate left-sided  hydronephrosis, unchanged.     PELVIS:     BLADDER:  No abnormalities identified.     REPRODUCTIVE ORGANS:  No abnormalities identified.     BOWEL:  The appendix appears normal.  No abnormalities identified.     VESSELS:  No abnormalities identified.  Abdominal aorta is normal in caliber.      PERITONEUM/RETROPERITONEUM/LYMPH NODES:  No free fluid.  No pneumoperitoneum.  No lymphadenopathy.     ABDOMINAL  WALL:  No abnormalities identified.  SOFT TISSUES:   No abnormalities identified.     BONES:  No acute fracture or aggressive osseous lesion.  Impression: 1.  A 5 mm stone of the upper left ureter, at or just beyond the UPJ,  is unchanged.  There is moderate left-sided hydronephrosis.  A 3 mm  stone of the left renal pelvis is unchanged.  There are several other  left-sided calyceal stones.  There is a cluster of stones of the left  lower pole.  The cluster measures 1.5 cm in greatest dimension.  2.  No ascites, free air or bowel obstruction.  3.  Mild splenomegaly.  Signed by Jesus Pelletier MD

## 2024-09-02 ENCOUNTER — ANESTHESIA (OUTPATIENT)
Dept: OPERATING ROOM | Facility: HOSPITAL | Age: 64
End: 2024-09-02
Payer: COMMERCIAL

## 2024-09-02 ENCOUNTER — APPOINTMENT (OUTPATIENT)
Dept: RADIOLOGY | Facility: HOSPITAL | Age: 64
End: 2024-09-02
Payer: COMMERCIAL

## 2024-09-02 ENCOUNTER — ANESTHESIA EVENT (OUTPATIENT)
Dept: OPERATING ROOM | Facility: HOSPITAL | Age: 64
End: 2024-09-02
Payer: COMMERCIAL

## 2024-09-02 VITALS
TEMPERATURE: 97.2 F | HEART RATE: 82 BPM | HEIGHT: 64 IN | OXYGEN SATURATION: 93 % | BODY MASS INDEX: 50.02 KG/M2 | SYSTOLIC BLOOD PRESSURE: 138 MMHG | DIASTOLIC BLOOD PRESSURE: 63 MMHG | RESPIRATION RATE: 16 BRPM | WEIGHT: 293 LBS

## 2024-09-02 PROBLEM — M06.9 RHEUMATOID ARTHRITIS (MULTI): Status: ACTIVE | Noted: 2024-09-02

## 2024-09-02 PROBLEM — K21.9 GASTROESOPHAGEAL REFLUX DISEASE: Status: ACTIVE | Noted: 2024-09-02

## 2024-09-02 PROBLEM — E66.01 CLASS 3 SEVERE OBESITY WITH BODY MASS INDEX (BMI) OF 60.0 TO 69.9 IN ADULT (MULTI): Status: ACTIVE | Noted: 2024-09-02

## 2024-09-02 PROBLEM — I48.91 ATRIAL FIBRILLATION (MULTI): Status: ACTIVE | Noted: 2024-09-02

## 2024-09-02 PROBLEM — E66.813 CLASS 3 SEVERE OBESITY WITH BODY MASS INDEX (BMI) OF 60.0 TO 69.9 IN ADULT: Status: ACTIVE | Noted: 2024-09-02

## 2024-09-02 LAB
ANION GAP SERPL CALC-SCNC: 12 MMOL/L (ref 10–20)
BUN SERPL-MCNC: 17 MG/DL (ref 6–23)
CALCIUM SERPL-MCNC: 8.5 MG/DL (ref 8.6–10.3)
CHLORIDE SERPL-SCNC: 96 MMOL/L (ref 98–107)
CO2 SERPL-SCNC: 31 MMOL/L (ref 21–32)
CREAT SERPL-MCNC: 1.98 MG/DL (ref 0.5–1.05)
EGFRCR SERPLBLD CKD-EPI 2021: 28 ML/MIN/1.73M*2
ERYTHROCYTE [DISTWIDTH] IN BLOOD BY AUTOMATED COUNT: 16.5 % (ref 11.5–14.5)
GLUCOSE SERPL-MCNC: 97 MG/DL (ref 74–99)
HCT VFR BLD AUTO: 29.6 % (ref 36–46)
HGB BLD-MCNC: 8.6 G/DL (ref 12–16)
HOLD SPECIMEN: NORMAL
MAGNESIUM SERPL-MCNC: 1.77 MG/DL (ref 1.6–2.4)
MCH RBC QN AUTO: 23.3 PG (ref 26–34)
MCHC RBC AUTO-ENTMCNC: 29.1 G/DL (ref 32–36)
MCV RBC AUTO: 80 FL (ref 80–100)
NRBC BLD-RTO: 0 /100 WBCS (ref 0–0)
PLATELET # BLD AUTO: 281 X10*3/UL (ref 150–450)
POTASSIUM SERPL-SCNC: 3.9 MMOL/L (ref 3.5–5.3)
RBC # BLD AUTO: 3.69 X10*6/UL (ref 4–5.2)
SODIUM SERPL-SCNC: 135 MMOL/L (ref 136–145)
WBC # BLD AUTO: 6.8 X10*3/UL (ref 4.4–11.3)

## 2024-09-02 PROCEDURE — 2500000001 HC RX 250 WO HCPCS SELF ADMINISTERED DRUGS (ALT 637 FOR MEDICARE OP): Performed by: HOSPITALIST

## 2024-09-02 PROCEDURE — 74420 UROGRAPHY RTRGR +-KUB: CPT | Performed by: UROLOGY

## 2024-09-02 PROCEDURE — 2500000002 HC RX 250 W HCPCS SELF ADMINISTERED DRUGS (ALT 637 FOR MEDICARE OP, ALT 636 FOR OP/ED): Performed by: HOSPITALIST

## 2024-09-02 PROCEDURE — 2500000004 HC RX 250 GENERAL PHARMACY W/ HCPCS (ALT 636 FOR OP/ED): Performed by: HOSPITALIST

## 2024-09-02 PROCEDURE — 52356 CYSTO/URETERO W/LITHOTRIPSY: CPT | Performed by: UROLOGY

## 2024-09-02 PROCEDURE — 3600000003 HC OR TIME - INITIAL BASE CHARGE - PROCEDURE LEVEL THREE: Performed by: UROLOGY

## 2024-09-02 PROCEDURE — C1894 INTRO/SHEATH, NON-LASER: HCPCS | Performed by: UROLOGY

## 2024-09-02 PROCEDURE — 3600000008 HC OR TIME - EACH INCREMENTAL 1 MINUTE - PROCEDURE LEVEL THREE: Performed by: UROLOGY

## 2024-09-02 PROCEDURE — 2500000005 HC RX 250 GENERAL PHARMACY W/O HCPCS: Performed by: STUDENT IN AN ORGANIZED HEALTH CARE EDUCATION/TRAINING PROGRAM

## 2024-09-02 PROCEDURE — 2500000004 HC RX 250 GENERAL PHARMACY W/ HCPCS (ALT 636 FOR OP/ED): Performed by: STUDENT IN AN ORGANIZED HEALTH CARE EDUCATION/TRAINING PROGRAM

## 2024-09-02 PROCEDURE — 96374 THER/PROPH/DIAG INJ IV PUSH: CPT | Mod: 59

## 2024-09-02 PROCEDURE — 36415 COLL VENOUS BLD VENIPUNCTURE: CPT | Performed by: HOSPITALIST

## 2024-09-02 PROCEDURE — C1769 GUIDE WIRE: HCPCS | Performed by: UROLOGY

## 2024-09-02 PROCEDURE — C1758 CATHETER, URETERAL: HCPCS | Performed by: UROLOGY

## 2024-09-02 PROCEDURE — 82365 CALCULUS SPECTROSCOPY: CPT | Performed by: UROLOGY

## 2024-09-02 PROCEDURE — 96375 TX/PRO/DX INJ NEW DRUG ADDON: CPT | Mod: 59

## 2024-09-02 PROCEDURE — C2617 STENT, NON-COR, TEM W/O DEL: HCPCS | Performed by: UROLOGY

## 2024-09-02 PROCEDURE — 7100000002 HC RECOVERY ROOM TIME - EACH INCREMENTAL 1 MINUTE: Performed by: UROLOGY

## 2024-09-02 PROCEDURE — 2550000001 HC RX 255 CONTRASTS: Performed by: UROLOGY

## 2024-09-02 PROCEDURE — 99238 HOSP IP/OBS DSCHRG MGMT 30/<: CPT | Performed by: HOSPITALIST

## 2024-09-02 PROCEDURE — 3700000001 HC GENERAL ANESTHESIA TIME - INITIAL BASE CHARGE: Performed by: UROLOGY

## 2024-09-02 PROCEDURE — 80048 BASIC METABOLIC PNL TOTAL CA: CPT | Performed by: HOSPITALIST

## 2024-09-02 PROCEDURE — 76937 US GUIDE VASCULAR ACCESS: CPT

## 2024-09-02 PROCEDURE — 7100000001 HC RECOVERY ROOM TIME - INITIAL BASE CHARGE: Performed by: UROLOGY

## 2024-09-02 PROCEDURE — G0378 HOSPITAL OBSERVATION PER HR: HCPCS

## 2024-09-02 PROCEDURE — 2500000001 HC RX 250 WO HCPCS SELF ADMINISTERED DRUGS (ALT 637 FOR MEDICARE OP): Performed by: STUDENT IN AN ORGANIZED HEALTH CARE EDUCATION/TRAINING PROGRAM

## 2024-09-02 PROCEDURE — 2780000003 HC OR 278 NO HCPCS: Performed by: UROLOGY

## 2024-09-02 PROCEDURE — 83735 ASSAY OF MAGNESIUM: CPT | Performed by: HOSPITALIST

## 2024-09-02 PROCEDURE — 3700000002 HC GENERAL ANESTHESIA TIME - EACH INCREMENTAL 1 MINUTE: Performed by: UROLOGY

## 2024-09-02 PROCEDURE — 85027 COMPLETE CBC AUTOMATED: CPT | Performed by: HOSPITALIST

## 2024-09-02 PROCEDURE — 74420 UROGRAPHY RTRGR +-KUB: CPT

## 2024-09-02 PROCEDURE — 2720000007 HC OR 272 NO HCPCS: Performed by: UROLOGY

## 2024-09-02 PROCEDURE — 96376 TX/PRO/DX INJ SAME DRUG ADON: CPT | Mod: 59

## 2024-09-02 DEVICE — STENT, INLAY OPTIMA, 6FR X 26CM: Type: IMPLANTABLE DEVICE | Site: URETER | Status: FUNCTIONAL

## 2024-09-02 RX ORDER — CEPHALEXIN 500 MG/1
500 CAPSULE ORAL 2 TIMES DAILY
Qty: 10 CAPSULE | Refills: 0 | Status: SHIPPED | OUTPATIENT
Start: 2024-09-02 | End: 2024-09-07

## 2024-09-02 RX ORDER — OXYCODONE HYDROCHLORIDE 5 MG/1
5 TABLET ORAL EVERY 4 HOURS PRN
Status: DISCONTINUED | OUTPATIENT
Start: 2024-09-02 | End: 2024-09-02 | Stop reason: HOSPADM

## 2024-09-02 RX ORDER — MEPERIDINE HYDROCHLORIDE 25 MG/ML
12.5 INJECTION INTRAMUSCULAR; INTRAVENOUS; SUBCUTANEOUS EVERY 10 MIN PRN
Status: DISCONTINUED | OUTPATIENT
Start: 2024-09-02 | End: 2024-09-02 | Stop reason: HOSPADM

## 2024-09-02 RX ORDER — OXYCODONE HYDROCHLORIDE 5 MG/1
10 TABLET ORAL EVERY 4 HOURS PRN
Status: DISCONTINUED | OUTPATIENT
Start: 2024-09-02 | End: 2024-09-02 | Stop reason: HOSPADM

## 2024-09-02 RX ORDER — ONDANSETRON HYDROCHLORIDE 2 MG/ML
4 INJECTION, SOLUTION INTRAVENOUS EVERY 6 HOURS PRN
Status: DISCONTINUED | OUTPATIENT
Start: 2024-09-02 | End: 2024-09-02 | Stop reason: HOSPADM

## 2024-09-02 RX ORDER — ROCURONIUM BROMIDE 10 MG/ML
INJECTION, SOLUTION INTRAVENOUS AS NEEDED
Status: DISCONTINUED | OUTPATIENT
Start: 2024-09-02 | End: 2024-09-02

## 2024-09-02 RX ORDER — FENTANYL CITRATE 50 UG/ML
25 INJECTION, SOLUTION INTRAMUSCULAR; INTRAVENOUS EVERY 5 MIN PRN
Status: DISCONTINUED | OUTPATIENT
Start: 2024-09-02 | End: 2024-09-02 | Stop reason: HOSPADM

## 2024-09-02 RX ORDER — SUCCINYLCHOLINE CHLORIDE 20 MG/ML
INJECTION INTRAMUSCULAR; INTRAVENOUS AS NEEDED
Status: DISCONTINUED | OUTPATIENT
Start: 2024-09-02 | End: 2024-09-02

## 2024-09-02 RX ORDER — HYDRALAZINE HYDROCHLORIDE 20 MG/ML
5 INJECTION INTRAMUSCULAR; INTRAVENOUS EVERY 30 MIN PRN
Status: DISCONTINUED | OUTPATIENT
Start: 2024-09-02 | End: 2024-09-02 | Stop reason: HOSPADM

## 2024-09-02 RX ORDER — PROPOFOL 10 MG/ML
INJECTION, EMULSION INTRAVENOUS AS NEEDED
Status: DISCONTINUED | OUTPATIENT
Start: 2024-09-02 | End: 2024-09-02

## 2024-09-02 RX ORDER — LABETALOL HYDROCHLORIDE 5 MG/ML
5 INJECTION, SOLUTION INTRAVENOUS ONCE AS NEEDED
Status: DISCONTINUED | OUTPATIENT
Start: 2024-09-02 | End: 2024-09-02 | Stop reason: HOSPADM

## 2024-09-02 RX ORDER — DIPHENHYDRAMINE HYDROCHLORIDE 50 MG/ML
12.5 INJECTION INTRAMUSCULAR; INTRAVENOUS ONCE AS NEEDED
Status: DISCONTINUED | OUTPATIENT
Start: 2024-09-02 | End: 2024-09-02 | Stop reason: HOSPADM

## 2024-09-02 RX ORDER — ONDANSETRON HYDROCHLORIDE 2 MG/ML
4 INJECTION, SOLUTION INTRAVENOUS ONCE AS NEEDED
Status: DISCONTINUED | OUTPATIENT
Start: 2024-09-02 | End: 2024-09-02 | Stop reason: HOSPADM

## 2024-09-02 RX ORDER — ESMOLOL HYDROCHLORIDE 10 MG/ML
INJECTION INTRAVENOUS AS NEEDED
Status: DISCONTINUED | OUTPATIENT
Start: 2024-09-02 | End: 2024-09-02

## 2024-09-02 RX ORDER — SODIUM CHLORIDE 9 MG/ML
50 INJECTION, SOLUTION INTRAVENOUS CONTINUOUS
Status: DISCONTINUED | OUTPATIENT
Start: 2024-09-02 | End: 2024-09-02 | Stop reason: HOSPADM

## 2024-09-02 RX ORDER — ALBUTEROL SULFATE 0.83 MG/ML
2.5 SOLUTION RESPIRATORY (INHALATION) ONCE AS NEEDED
Status: DISCONTINUED | OUTPATIENT
Start: 2024-09-02 | End: 2024-09-02 | Stop reason: HOSPADM

## 2024-09-02 RX ORDER — MIDAZOLAM HYDROCHLORIDE 1 MG/ML
INJECTION, SOLUTION INTRAMUSCULAR; INTRAVENOUS AS NEEDED
Status: DISCONTINUED | OUTPATIENT
Start: 2024-09-02 | End: 2024-09-02

## 2024-09-02 RX ORDER — SODIUM CHLORIDE, SODIUM LACTATE, POTASSIUM CHLORIDE, CALCIUM CHLORIDE 600; 310; 30; 20 MG/100ML; MG/100ML; MG/100ML; MG/100ML
100 INJECTION, SOLUTION INTRAVENOUS CONTINUOUS
Status: DISCONTINUED | OUTPATIENT
Start: 2024-09-02 | End: 2024-09-02 | Stop reason: HOSPADM

## 2024-09-02 RX ORDER — LIDOCAINE HYDROCHLORIDE 20 MG/ML
INJECTION, SOLUTION INFILTRATION; PERINEURAL AS NEEDED
Status: DISCONTINUED | OUTPATIENT
Start: 2024-09-02 | End: 2024-09-02

## 2024-09-02 RX ORDER — FENTANYL CITRATE 50 UG/ML
INJECTION, SOLUTION INTRAMUSCULAR; INTRAVENOUS AS NEEDED
Status: DISCONTINUED | OUTPATIENT
Start: 2024-09-02 | End: 2024-09-02

## 2024-09-02 RX ADMIN — MORPHINE SULFATE 2 MG: 2 INJECTION, SOLUTION INTRAMUSCULAR; INTRAVENOUS at 06:51

## 2024-09-02 RX ADMIN — SODIUM CHLORIDE 50 ML/HR: 9 INJECTION, SOLUTION INTRAVENOUS at 08:06

## 2024-09-02 RX ADMIN — ONDANSETRON 4 MG: 2 INJECTION INTRAMUSCULAR; INTRAVENOUS at 08:06

## 2024-09-02 RX ADMIN — PREDNISONE 10 MG: 10 TABLET ORAL at 12:30

## 2024-09-02 RX ADMIN — TAMSULOSIN HYDROCHLORIDE 0.4 MG: 0.4 CAPSULE ORAL at 12:30

## 2024-09-02 RX ADMIN — Medication 5000 UNITS: at 12:30

## 2024-09-02 RX ADMIN — OXYCODONE HYDROCHLORIDE 5 MG: 5 TABLET ORAL at 12:30

## 2024-09-02 RX ADMIN — FUROSEMIDE 20 MG: 40 TABLET ORAL at 12:30

## 2024-09-02 RX ADMIN — PANTOPRAZOLE SODIUM 40 MG: 40 TABLET, DELAYED RELEASE ORAL at 12:30

## 2024-09-02 RX ADMIN — MORPHINE SULFATE 2 MG: 2 INJECTION, SOLUTION INTRAMUSCULAR; INTRAVENOUS at 02:37

## 2024-09-02 SDOH — HEALTH STABILITY: MENTAL HEALTH: CURRENT SMOKER: 0

## 2024-09-02 ASSESSMENT — PAIN SCALES - GENERAL
PAINLEVEL_OUTOF10: 0 - NO PAIN
PAINLEVEL_OUTOF10: 0 - NO PAIN
PAINLEVEL_OUTOF10: 5 - MODERATE PAIN
PAINLEVEL_OUTOF10: 0 - NO PAIN
PAIN_LEVEL: 0
PAINLEVEL_OUTOF10: 0 - NO PAIN
PAINLEVEL_OUTOF10: 0 - NO PAIN
PAINLEVEL_OUTOF10: 7

## 2024-09-02 ASSESSMENT — COGNITIVE AND FUNCTIONAL STATUS - GENERAL
DAILY ACTIVITIY SCORE: 24
MOBILITY SCORE: 23
DAILY ACTIVITIY SCORE: 24
CLIMB 3 TO 5 STEPS WITH RAILING: A LITTLE
CLIMB 3 TO 5 STEPS WITH RAILING: A LITTLE
MOBILITY SCORE: 23

## 2024-09-02 ASSESSMENT — PAIN DESCRIPTION - LOCATION: LOCATION: ABDOMEN

## 2024-09-02 ASSESSMENT — PAIN - FUNCTIONAL ASSESSMENT
PAIN_FUNCTIONAL_ASSESSMENT: 0-10

## 2024-09-02 NOTE — ANESTHESIA PREPROCEDURE EVALUATION
Kristy Correa is a 63 y.o. female here for:    Ureteroscopy with Lithotripsy Laser  With Tucker Sherman MD  * No Diagnosis Codes entered *    Relevant Problems   Cardiac  Echo 5/2023:  CONCLUSIONS:  1. Left ventricular systolic function is normal with a 60-65% estimated ejection fraction.  2. Poorly visualized anatomical structures due to suboptimal image quality.  3. Unable to perform strain secondary to poor views.     (+) Atrial fibrillation (Multi)      GI   (+) Gastroesophageal reflux disease      /Renal   (+) Kidney stones      Endocrine   (+) Class 3 severe obesity with body mass index (BMI) of 60.0 to 69.9 in adult (Multi)      Musculoskeletal   (+) Rheumatoid arthritis (Multi)       Lab Results   Component Value Date    HGB 8.6 (L) 09/02/2024    HCT 29.6 (L) 09/02/2024    WBC 6.8 09/02/2024     09/02/2024     (L) 09/02/2024    K 3.9 09/02/2024    CL 96 (L) 09/02/2024    CREATININE 1.98 (H) 09/02/2024    BUN 17 09/02/2024       Social History     Tobacco Use   Smoking Status Never    Passive exposure: Never   Smokeless Tobacco Never       Allergies   Allergen Reactions    Hydrocortisone Rash     Patient had Cortizone injection in the left knee, rash was arms, legs, torso.  Itch, warm.    Fexofenadine Unknown     vertigo    Methylprednisolone Unknown     Allergy to prednisone in the medrol dose pack    Tetanus Vaccines And Toxoid Swelling and Unknown    Azithromycin Rash and Unknown     Zpak       Current Outpatient Medications   Medication Instructions    B complex-vitamin C-folic acid (Nephro-La Rx) 1- mg-mg-mcg tablet 1 tablet, oral, Daily with breakfast    cephalexin (KEFLEX) 500 mg, oral, 2 times daily    cholecalciferol (VITAMIN D-3) 5,000 Units, oral, Daily    furosemide (LASIX) 20 mg, oral, Daily    ondansetron ODT (ZOFRAN-ODT) 4 mg, oral, Every 8 hours PRN    pantoprazole (PROTONIX) 40 mg, oral, 2 times daily, Do not crush, chew, or split.    predniSONE (DELTASONE) 10 mg,  oral, Daily    tamsulosin (FLOMAX) 0.4 mg, oral, Daily       Past Surgical History:   Procedure Laterality Date    OTHER SURGICAL HISTORY  06/17/2022    Cholecystectomy    OTHER SURGICAL HISTORY  06/17/2022    Colposcopy    OTHER SURGICAL HISTORY  06/17/2022    Tubal ligation bilateral    OTHER SURGICAL HISTORY  06/17/2022    Complete colonoscopy    OTHER SURGICAL HISTORY  09/27/2022    Bariatric surgery    OTHER SURGICAL HISTORY  09/27/2022    Esophagogastroduodenoscopy       No family history on file.    NPO Details:  No data recorded    Physical Exam    Airway  Mallampati: II  TM distance: >3 FB  Neck ROM: full     Cardiovascular - normal exam     Dental - normal exam     Pulmonary - normal exam     Abdominal            Anesthesia Plan    History of general anesthesia?: yes  History of complications of general anesthesia?: no    ASA 3     general     The patient is not a current smoker.    intravenous induction   Postoperative administration of opioids is intended.  Trial extubation is planned.  Anesthetic plan and risks discussed with patient.

## 2024-09-02 NOTE — DISCHARGE INSTRUCTIONS
It was nice caring for you during your stay at [facility]. As we discussed,  -Please continue to take your medications, please continue your Keflex twice daily.  Please follow-up with urology for stent removal.    Wishing you a healthy recovery!

## 2024-09-02 NOTE — ANESTHESIA POSTPROCEDURE EVALUATION
Patient: Kristy Correa    Procedure Summary       Date: 09/02/24 Room / Location: [facility] OR [facility] / [facility]    Anesthesia Start: 0924 Anesthesia Stop:     Procedure: Ureteroscopy with Lithotripsy Laser (Left) Diagnosis: (Left Ureteral Stone)    Surgeons: Tucker Sherman MD Responsible Provider: Travis Hyman DO    Anesthesia Type: general ASA Status: 3            Anesthesia Type: general    Vitals Value Taken Time   /72 09/02/24 1015   Temp 36.2 09/02/24 1015   Pulse 80 09/02/24 1015   Resp 16 09/02/24 1015   SpO2 98 09/02/24 1015       Anesthesia Post Evaluation    Patient location during evaluation: PACU  Patient participation: complete - patient participated  Level of consciousness: awake  Pain score: 0  Pain management: adequate  Airway patency: patent  Cardiovascular status: acceptable, blood pressure returned to baseline and hemodynamically stable  Respiratory status: acceptable, nonlabored ventilation, spontaneous ventilation and face mask  Hydration status: acceptable  Postoperative Nausea and Vomiting: none        No notable events documented.

## 2024-09-02 NOTE — ANESTHESIA PROCEDURE NOTES
Airway  Date/Time: 9/2/2024 9:34 AM  Urgency: elective    Airway not difficult    Staffing  Performed: attending   Authorized by: Travis Hyman DO    Performed by: Travis Hyman DO  Patient location during procedure: OR    Indications and Patient Condition  Indications for airway management: anesthesia  Spontaneous Ventilation: absent  Sedation level: deep  Preoxygenated: yes  Patient position: sniffing  Mask difficulty assessment: 0 - not attempted  Planned trial extubation    Final Airway Details  Final airway type: endotracheal airway      Successful airway: ETT  Cuffed: yes   Successful intubation technique: video laryngoscopy  Facilitating devices/methods: intubating stylet  Endotracheal tube insertion site: oral  Blade type: Glidscope.  Blade size: #3  ETT size (mm): 7.5  Cormack-Lehane Classification: grade I - full view of glottis  Placement verified by: capnometry   Measured from: lips  ETT to lips (cm): 22  Number of attempts at approach: 1  Number of other approaches attempted: 0

## 2024-09-02 NOTE — OP NOTE
Date: 2024 - 2024  OR Location: [facility]    Name: Kristy Correa, : 1960, Age: 63 y.o., MRN: [id number], Sex: female    Diagnosis  * No Diagnosis Codes entered * * No Diagnosis Codes entered *     Procedures  1.  Cystoscopy and left retrograde pyelogram  2.  Left ureteroscopy and holmium laser lithotripsy  3.  Left ureteroscopic stone basket extraction  4.  Left double-J ureteral stent placed    Surgeons      * Tucker Sherman - Primary    Resident/Fellow/Other Assistant:  Surgeons and Role:  * No surgeons found with a matching role *    Procedure Summary  Anesthesia: * No anesthesia type entered *  ASA: III  Anesthesia Staff:   Anesthesiologist: Travis Hyman DO  Estimated Blood Loss: Minimal  Intra-op Medications:   Medication Name Total Dose   sodium chloride 0.9 % irrigation solution 3,000 mL   ceFAZolin in dextrose (iso-os) (Ancef) IVPB 2 g 2 g              Anesthesia Record               Intraprocedure I/O Totals       None           Specimen:   No specimens collected during this procedure.    Staff:   Circulator: Liane Yanez RN  Scrub Person: Mary Huang         Drains and/or Catheters: * None in log *    Tourniquet Times:         Implants:     Findings: 5 mm left UPJ stone; additional small stones in left kidney    Indications: Kristy Correa is an 63 y.o. female who is having surgery for Left Ureteral Stone.  Patient presented with left flank pain.  She was found to have a proximal ureteral calculus.  Initially discharged with conservative therapy but now returns with recurrent pain.  Presents today for definitive stone treatment with ureteroscopy.  Risk, benefits, and alternatives discussed with the patient.  Informed consent was obtained.    Procedure Details: Patient was brought to the operating room and placed in the supine position.  General anesthesia was induced.  Once anesthetized, her legs were placed in the dorsolithotomy position.  Her genitalia were prepped and draped  in usual sterile fashion.  A 22 Turkish cystoscope was passed atraumatically per the urethra and the bladder was inspected.  No tumors, clots, stones, or foreign bodies were identified.  Attention was turned to the left ureteral orifice.  It was cannulated with a 5 Turkish open-ended catheter and a retrograde pyelogram was performed.  There is a filling defect at the UPJ consistent with a stone.  We advance to 0.35 sensor wires into the renal pelvis under fluoroscopy.  These were left in place and the cystoscope was removed.  1 wire was secured to the drape as a safety wire.  A 13 Turkish access sheath was advanced over the second wire into the mid ureter.  The working wire was then removed.  We then passed a 9 Turkish flexible ureteroscope.  We identified the stone in the UPJ.  It was fragmented and basket extracted with a 200 µm holmium laser fiber.  We then inspected the kidney.  There were additional smaller stones which were also treated with laser lithotripsy.  The ureteroscope and access sheath were then backed out the ureter.  Safety wire was backloaded through the cystoscope and the cystoscope was reinserted.  A 6 Turkish by 26 cm double-J stent was then advanced over the wire and into proper position.  Once in proper position, the wire was removed.  Fluoroscopy reveals good position of the stent.  The bladder was drained and the cystoscope was removed.  Patient was awakened and taken to the PACU in stable condition.    Complications:  None; patient tolerated the procedure well.    Disposition: PACU - hemodynamically stable.  Condition: stable     Tucker Sherman  Phone Number: [phone]

## 2024-09-02 NOTE — PROGRESS NOTES
09/02/24 1505   Current Planned Discharge Disposition   Current Planned Discharge Disposition Home     HOME no needs.

## 2024-09-02 NOTE — DISCHARGE SUMMARY
DISCHARGE DIAGNOSIS     5 mm obstructive kidney stone  Left-sided hydronephrosis  History of rheumatoid arthritis  Morbid obesity    HOSPITAL COURSE AND DETAILS     Ms. Correa is a 63-year-old with past medical history of rheumatoid arthritis, hyper myalgia who presented to the emergency room with abdominal flank pain.     She was in the emergency room on August 28 for a kidney stone.  CT abdomen pelvis showed a 5 mm stone of the upper left ureter.  She was discharged on antibiotics, Percocet.  However she states that her pain was significant which brought her back to the emergency room.    She was kept n.p.o., started on IV fluids.  Ceftriaxone IV.  She was seen by urology, underwent a cystoscopy with stent placement.,  Tolerated the procedure well.  She was discharged home on Keflex, Flomax.  She will follow-up with urology outpatient for stent removal.    * Some of these notes were completed using Dragon voice recognition technology and may include unintended areas with respect to translation of word, typographical errors or primary areas which may not have been identified prior to finalization of the document.          DISCHARGE PHYSICAL EXAM     Last Recorded Vitals:  Vitals:    09/02/24 1020 09/02/24 1030 09/02/24 1040 09/02/24 1058   BP: 126/54 111/64 121/59 136/63   Patient Position:       Pulse: 81 81 83 81   Resp: 14 20 15 16   Temp:   36.2 °C (97.2 °F)    TempSrc:       SpO2: 99% 95% 94% 92%   Weight:       Height:           Physical Exam  PHYSICAL EXAM:   GENERAL: Laying in bed, does not appear to be in any distress.   HEENT: HEAD: Normocephalic atraumatic.  Neck: Supple.  Eyes: Pupils are reactive to direct light.   CVS: S1, S2 heard. Regular rate and rhythm  LUNGS: Clear to auscultate bilaterally. No wheezing or rhonchi appreciated.  ABDOMEN: Soft, nontender to palpate. Positive bowel sounds. No guarding or rebound appreciated.  NEUROLOGICAL: No focal neurological deficits appreciated. Cranial  nerves are grossly intact.  EXTREMITIES: Dorsalis pedis pulses can be appreciated. No edema appreciated.  SKIN:  Grossly intact, warm and dry.    DISCHARGE MEDICATIONS        Your medication list        CHANGE how you take these medications        Instructions Last Dose Given Next Dose Due   cephalexin 500 mg capsule  Commonly known as: Keflex  What changed: Another medication with the same name was added. Make sure you understand how and when to take each.      Take 1 capsule (500 mg) by mouth 2 times a day for 7 days.       cephalexin 500 mg capsule  Commonly known as: Keflex  What changed: You were already taking a medication with the same name, and this prescription was added. Make sure you understand how and when to take each.      Take 1 capsule (500 mg) by mouth 2 times a day for 5 days.              CONTINUE taking these medications        Instructions Last Dose Given Next Dose Due   B complex-vitamin C-folic acid 1- mg-mg-mcg tablet  Commonly known as: Nephro-La Rx           cholecalciferol 5,000 Units tablet  Commonly known as: Vitamin D-3           furosemide 20 mg tablet  Commonly known as: Lasix           ondansetron ODT 4 mg disintegrating tablet  Commonly known as: Zofran-ODT      Take 1 tablet (4 mg) by mouth every 8 hours if needed for nausea or vomiting for up to 7 days.       pantoprazole 20 mg EC tablet  Commonly known as: ProtoNix           predniSONE 10 mg tablet  Commonly known as: Deltasone           tamsulosin 0.4 mg 24 hr capsule  Commonly known as: Flomax      Take 1 capsule (0.4 mg) by mouth once daily for 7 days.              STOP taking these medications      HYDROcodone-acetaminophen 5-325 mg tablet  Commonly known as: Norco                  Where to Get Your Medications        These medications were sent to [id number] - [place], [place] - [address]  [address] [facility] [place] [place] [facility] [place] [address]      Phone: [phone]   cephalexin 500 mg capsule           OUTPATIENT FOLLOW-UP      No future appointments.

## 2024-09-04 ENCOUNTER — APPOINTMENT (OUTPATIENT)
Dept: UROLOGY | Facility: CLINIC | Age: 64
End: 2024-09-04
Payer: COMMERCIAL

## 2024-09-04 LAB
APPEARANCE STONE: NORMAL
COMPN STONE: NORMAL
SPECIMEN WT: 33 MG

## 2024-09-06 RX ORDER — LIDOCAINE HYDROCHLORIDE 20 MG/ML
1 JELLY TOPICAL ONCE
Status: SHIPPED | OUTPATIENT
Start: 2024-09-10

## 2024-09-10 ENCOUNTER — APPOINTMENT (OUTPATIENT)
Dept: UROLOGY | Facility: CLINIC | Age: 64
End: 2024-09-10
Payer: COMMERCIAL

## 2024-09-10 VITALS — DIASTOLIC BLOOD PRESSURE: 75 MMHG | HEART RATE: 70 BPM | TEMPERATURE: 98.1 F | SYSTOLIC BLOOD PRESSURE: 140 MMHG

## 2024-09-10 DIAGNOSIS — N20.0 KIDNEY STONES: ICD-10-CM

## 2024-09-10 LAB
POC APPEARANCE, URINE: ABNORMAL
POC BILIRUBIN, URINE: NEGATIVE
POC BLOOD, URINE: ABNORMAL
POC COLOR, URINE: ABNORMAL
POC GLUCOSE, URINE: NEGATIVE MG/DL
POC KETONES, URINE: NEGATIVE MG/DL
POC LEUKOCYTES, URINE: ABNORMAL
POC NITRITE,URINE: NEGATIVE
POC PH, URINE: 5.5 PH
POC PROTEIN, URINE: ABNORMAL MG/DL
POC SPECIFIC GRAVITY, URINE: 1.02
POC UROBILINOGEN, URINE: 0.2 EU/DL

## 2024-09-10 PROCEDURE — 81003 URINALYSIS AUTO W/O SCOPE: CPT | Performed by: UROLOGY

## 2024-09-10 NOTE — PROGRESS NOTES
Subjective   Patient ID: Kristy Correa is a 63 y.o. female new patient kindly referred by ER for kidney stones who presents for Ureteral stent removal. Patient is s/p left ureteroscopy, laser lithotripsy, stone extraction, and left stent placement on 9/2/24.    HPI  The patient is anxious about the stent removal.   The patient expressed her gratitude for having surgery on the holiday.     Past Medical History  Past Medical History:   Diagnosis Date    Arthritis     COVID-19 06/17/2022    COVID-19 virus infection    GERD (gastroesophageal reflux disease)     Paroxysmal atrial fibrillation (Multi)     Paroxysmal atrial fibrillation    RA (rheumatoid arthritis) (Multi)         Surgical History  Past Surgical History:   Procedure Laterality Date    CYSTOSCOPY W/ LASER LITHOTRIPSY Left 09/02/2024    OTHER SURGICAL HISTORY  06/17/2022    Cholecystectomy    OTHER SURGICAL HISTORY  06/17/2022    Colposcopy    OTHER SURGICAL HISTORY  06/17/2022    Tubal ligation bilateral    OTHER SURGICAL HISTORY  06/17/2022    Complete colonoscopy    OTHER SURGICAL HISTORY  09/27/2022    Bariatric surgery    OTHER SURGICAL HISTORY  09/27/2022    Esophagogastroduodenoscopy         Social History  She reports that she has never smoked. She has never been exposed to tobacco smoke. She has never used smokeless tobacco. Alcohol use questions deferred to the physician. She reports that she does not use drugs.    Family History  No family history on file.    Medications    Current Outpatient Medications:     B complex-vitamin C-folic acid (Nephro-La Rx) 1- mg-mg-mcg tablet, Take 1 tablet by mouth once daily with breakfast., Disp: , Rfl:     cholecalciferol (Vitamin D-3) 5,000 Units tablet, Take 1 tablet (5,000 Units) by mouth once daily., Disp: , Rfl:     furosemide (Lasix) 20 mg tablet, Take 1 tablet (20 mg) by mouth once daily., Disp: , Rfl:     pantoprazole (ProtoNix) 20 mg EC tablet, Take 2 tablets (40 mg) by mouth 2 times a day. Do  not crush, chew, or split., Disp: , Rfl:     predniSONE (Deltasone) 10 mg tablet, Take 1 tablet (10 mg) by mouth once daily., Disp: , Rfl:     Current Facility-Administered Medications:     lidocaine 2 % mucosal jelly (Uro-Jet) 1 Application, 1 Application, urethral, Once, Tucker Sherman MD     Allergies  Hydrocortisone, Fentanyl, Fexofenadine, Hydromorphone, Methylprednisolone, Tetanus vaccines and toxoid, and Azithromycin     Review of Systems  A 12 system review was completed and is negative with the exception of those signs and symptoms noted in the history of present illness.    Objective   Physical Exam  General: in NAD, appears stated age   Head: normocephalic, atraumatic   Neck: supple; trachea is midline   Respiratory: normal effort, no use of accessory muscles   Cardiovascular: no peripheral edema   Abdomen: soft, nondistended, nontender, no rebound or guarding, no organomegaly, no CVA tenderness, no hernia   Lymphatic: no lymphadenopathy noted   Skin: normal turgor, no rashes   Neurologic: grossly intact, oriented to person/place/time   Psychiatric: mode and affect appropriate   : normal external genitalia, normal urethral meatus, normal urethra, bladder nonpalpable normal perineum, no prolapse     Procedure  Cystoscopy for left stent removal  Patient's genitalia were prepped and draped in the usual sterile fashion. A 15 [place] flexible cystoscope was passed atraumatically per the urethra. We entered the bladder and identified the previously placed ureteral stent emanating from the left ureteral orifice. It was grasped with a flexible grasper and removed intact. Patient tolerated the procedure without difficulty.      Assessment/Plan   Problem List Items Addressed This Visit             ICD-10-CM    Kidney stones N20.0    Relevant Medications    lidocaine 2 % mucosal jelly (Uro-Jet) 1 Application    Other Relevant Orders    POCT UA Automated manually resulted (Completed)    Cystourethroscopy  (Completed)    US renal complete     The patient tolerated the left stent removal procedure well. Order Renal US for 1 month and see the patient in follow-up with results.      Scribe Attestation  By signing my name below, I, Eduar Rodas   attest that this documentation has been prepared under the direction and in the presence of Tucker Sherman MD.

## 2024-09-13 ENCOUNTER — APPOINTMENT (OUTPATIENT)
Dept: CARDIOLOGY | Facility: HOSPITAL | Age: 64
End: 2024-09-13
Payer: COMMERCIAL

## 2024-09-13 ENCOUNTER — HOSPITAL ENCOUNTER (INPATIENT)
Facility: HOSPITAL | Age: 64
LOS: 2 days | Discharge: HOME | End: 2024-09-15
Attending: EMERGENCY MEDICINE | Admitting: HOSPITALIST
Payer: COMMERCIAL

## 2024-09-13 ENCOUNTER — APPOINTMENT (OUTPATIENT)
Dept: RADIOLOGY | Facility: HOSPITAL | Age: 64
End: 2024-09-13
Payer: COMMERCIAL

## 2024-09-13 DIAGNOSIS — R07.9 CHEST PAIN, UNSPECIFIED TYPE: ICD-10-CM

## 2024-09-13 DIAGNOSIS — N20.1 URETERAL STONE: ICD-10-CM

## 2024-09-13 DIAGNOSIS — R10.9 FLANK PAIN: Primary | ICD-10-CM

## 2024-09-13 LAB
ALBUMIN SERPL BCP-MCNC: 4.1 G/DL (ref 3.4–5)
ALP SERPL-CCNC: 62 U/L (ref 33–136)
ALT SERPL W P-5'-P-CCNC: 16 U/L (ref 7–45)
ANION GAP SERPL CALC-SCNC: 13 MMOL/L (ref 10–20)
APPEARANCE UR: CLEAR
APTT PPP: 33 SECONDS (ref 27–38)
AST SERPL W P-5'-P-CCNC: 18 U/L (ref 9–39)
ATRIAL RATE: 76 BPM
BASOPHILS # BLD AUTO: 0.04 X10*3/UL (ref 0–0.1)
BASOPHILS NFR BLD AUTO: 0.4 %
BILIRUB SERPL-MCNC: 0.7 MG/DL (ref 0–1.2)
BILIRUB UR STRIP.AUTO-MCNC: NEGATIVE MG/DL
BNP SERPL-MCNC: 64 PG/ML (ref 0–99)
BUN SERPL-MCNC: 19 MG/DL (ref 6–23)
CALCIUM SERPL-MCNC: 9.2 MG/DL (ref 8.6–10.3)
CARDIAC TROPONIN I PNL SERPL HS: 6 NG/L (ref 0–13)
CARDIAC TROPONIN I PNL SERPL HS: 8 NG/L (ref 0–13)
CHLORIDE SERPL-SCNC: 104 MMOL/L (ref 98–107)
CK SERPL-CCNC: 36 U/L (ref 0–215)
CO2 SERPL-SCNC: 27 MMOL/L (ref 21–32)
COLOR UR: COLORLESS
CREAT SERPL-MCNC: 1.14 MG/DL (ref 0.5–1.05)
EGFRCR SERPLBLD CKD-EPI 2021: 55 ML/MIN/1.73M*2
EOSINOPHIL # BLD AUTO: 0.07 X10*3/UL (ref 0–0.7)
EOSINOPHIL NFR BLD AUTO: 0.6 %
ERYTHROCYTE [DISTWIDTH] IN BLOOD BY AUTOMATED COUNT: 16.8 % (ref 11.5–14.5)
GLUCOSE SERPL-MCNC: 104 MG/DL (ref 74–99)
GLUCOSE UR STRIP.AUTO-MCNC: NORMAL MG/DL
HCT VFR BLD AUTO: 34.4 % (ref 36–46)
HGB BLD-MCNC: 10 G/DL (ref 12–16)
HYALINE CASTS #/AREA URNS AUTO: ABNORMAL /LPF
IMM GRANULOCYTES # BLD AUTO: 0.1 X10*3/UL (ref 0–0.7)
IMM GRANULOCYTES NFR BLD AUTO: 0.9 % (ref 0–0.9)
INR PPP: 1.1 (ref 0.9–1.1)
KETONES UR STRIP.AUTO-MCNC: NEGATIVE MG/DL
LACTATE SERPL-SCNC: 1.2 MMOL/L (ref 0.4–2)
LEUKOCYTE ESTERASE UR QL STRIP.AUTO: NEGATIVE
LIPASE SERPL-CCNC: 24 U/L (ref 9–82)
LYMPHOCYTES # BLD AUTO: 0.7 X10*3/UL (ref 1.2–4.8)
LYMPHOCYTES NFR BLD AUTO: 6.4 %
MCH RBC QN AUTO: 23.4 PG (ref 26–34)
MCHC RBC AUTO-ENTMCNC: 29.1 G/DL (ref 32–36)
MCV RBC AUTO: 81 FL (ref 80–100)
MONOCYTES # BLD AUTO: 0.79 X10*3/UL (ref 0.1–1)
MONOCYTES NFR BLD AUTO: 7.2 %
NEUTROPHILS # BLD AUTO: 9.2 X10*3/UL (ref 1.2–7.7)
NEUTROPHILS NFR BLD AUTO: 84.5 %
NITRITE UR QL STRIP.AUTO: NEGATIVE
NRBC BLD-RTO: 0 /100 WBCS (ref 0–0)
P AXIS: 71 DEGREES
P OFFSET: 159 MS
P ONSET: 118 MS
PH UR STRIP.AUTO: 5.5 [PH]
PLATELET # BLD AUTO: 353 X10*3/UL (ref 150–450)
POTASSIUM SERPL-SCNC: 3.6 MMOL/L (ref 3.5–5.3)
PR INTERVAL: 184 MS
PROT SERPL-MCNC: 7.3 G/DL (ref 6.4–8.2)
PROT UR STRIP.AUTO-MCNC: NEGATIVE MG/DL
PROTHROMBIN TIME: 12.3 SECONDS (ref 9.8–12.8)
Q ONSET: 210 MS
QRS COUNT: 12 BEATS
QRS DURATION: 96 MS
QT INTERVAL: 388 MS
QTC CALCULATION(BAZETT): 436 MS
QTC FREDERICIA: 419 MS
R AXIS: -33 DEGREES
RBC # BLD AUTO: 4.27 X10*6/UL (ref 4–5.2)
RBC # UR STRIP.AUTO: ABNORMAL /UL
RBC #/AREA URNS AUTO: ABNORMAL /HPF
SODIUM SERPL-SCNC: 140 MMOL/L (ref 136–145)
SP GR UR STRIP.AUTO: 1
T AXIS: 38 DEGREES
T OFFSET: 404 MS
UROBILINOGEN UR STRIP.AUTO-MCNC: NORMAL MG/DL
VENTRICULAR RATE: 76 BPM
WBC # BLD AUTO: 10.9 X10*3/UL (ref 4.4–11.3)
WBC #/AREA URNS AUTO: ABNORMAL /HPF

## 2024-09-13 PROCEDURE — 96375 TX/PRO/DX INJ NEW DRUG ADDON: CPT | Mod: 59

## 2024-09-13 PROCEDURE — 71275 CT ANGIOGRAPHY CHEST: CPT

## 2024-09-13 PROCEDURE — 85025 COMPLETE CBC W/AUTO DIFF WBC: CPT

## 2024-09-13 PROCEDURE — 36415 COLL VENOUS BLD VENIPUNCTURE: CPT

## 2024-09-13 PROCEDURE — 74177 CT ABD & PELVIS W/CONTRAST: CPT

## 2024-09-13 PROCEDURE — 84484 ASSAY OF TROPONIN QUANT: CPT

## 2024-09-13 PROCEDURE — 83605 ASSAY OF LACTIC ACID: CPT

## 2024-09-13 PROCEDURE — 96365 THER/PROPH/DIAG IV INF INIT: CPT | Mod: 59

## 2024-09-13 PROCEDURE — 81001 URINALYSIS AUTO W/SCOPE: CPT

## 2024-09-13 PROCEDURE — 83690 ASSAY OF LIPASE: CPT

## 2024-09-13 PROCEDURE — 2550000001 HC RX 255 CONTRASTS

## 2024-09-13 PROCEDURE — 2500000001 HC RX 250 WO HCPCS SELF ADMINISTERED DRUGS (ALT 637 FOR MEDICARE OP): Performed by: HOSPITALIST

## 2024-09-13 PROCEDURE — 74177 CT ABD & PELVIS W/CONTRAST: CPT | Performed by: STUDENT IN AN ORGANIZED HEALTH CARE EDUCATION/TRAINING PROGRAM

## 2024-09-13 PROCEDURE — 82550 ASSAY OF CK (CPK): CPT

## 2024-09-13 PROCEDURE — 1210000001 HC SEMI-PRIVATE ROOM DAILY

## 2024-09-13 PROCEDURE — 99285 EMERGENCY DEPT VISIT HI MDM: CPT | Mod: 25

## 2024-09-13 PROCEDURE — 2500000004 HC RX 250 GENERAL PHARMACY W/ HCPCS (ALT 636 FOR OP/ED): Performed by: HOSPITALIST

## 2024-09-13 PROCEDURE — 99223 1ST HOSP IP/OBS HIGH 75: CPT | Performed by: HOSPITALIST

## 2024-09-13 PROCEDURE — 96361 HYDRATE IV INFUSION ADD-ON: CPT | Mod: 59

## 2024-09-13 PROCEDURE — G0378 HOSPITAL OBSERVATION PER HR: HCPCS

## 2024-09-13 PROCEDURE — 96376 TX/PRO/DX INJ SAME DRUG ADON: CPT | Mod: 59

## 2024-09-13 PROCEDURE — 83880 ASSAY OF NATRIURETIC PEPTIDE: CPT

## 2024-09-13 PROCEDURE — 96374 THER/PROPH/DIAG INJ IV PUSH: CPT

## 2024-09-13 PROCEDURE — 71275 CT ANGIOGRAPHY CHEST: CPT | Performed by: STUDENT IN AN ORGANIZED HEALTH CARE EDUCATION/TRAINING PROGRAM

## 2024-09-13 PROCEDURE — 84460 ALANINE AMINO (ALT) (SGPT): CPT

## 2024-09-13 PROCEDURE — 2500000004 HC RX 250 GENERAL PHARMACY W/ HCPCS (ALT 636 FOR OP/ED)

## 2024-09-13 PROCEDURE — 93005 ELECTROCARDIOGRAM TRACING: CPT

## 2024-09-13 PROCEDURE — 85610 PROTHROMBIN TIME: CPT

## 2024-09-13 RX ORDER — POTASSIUM CITRATE 10 MEQ/1
10 TABLET, EXTENDED RELEASE ORAL
Status: DISCONTINUED | OUTPATIENT
Start: 2024-09-13 | End: 2024-09-15 | Stop reason: HOSPADM

## 2024-09-13 RX ORDER — PANTOPRAZOLE SODIUM 40 MG/1
40 TABLET, DELAYED RELEASE ORAL 2 TIMES DAILY
Status: DISCONTINUED | OUTPATIENT
Start: 2024-09-13 | End: 2024-09-15 | Stop reason: HOSPADM

## 2024-09-13 RX ORDER — MORPHINE SULFATE 4 MG/ML
4 INJECTION, SOLUTION INTRAMUSCULAR; INTRAVENOUS ONCE
Status: COMPLETED | OUTPATIENT
Start: 2024-09-13 | End: 2024-09-13

## 2024-09-13 RX ORDER — CEFTRIAXONE 1 G/50ML
1 INJECTION, SOLUTION INTRAVENOUS EVERY 24 HOURS
Status: DISCONTINUED | OUTPATIENT
Start: 2024-09-13 | End: 2024-09-15 | Stop reason: HOSPADM

## 2024-09-13 RX ORDER — PREDNISONE 10 MG/1
10 TABLET ORAL DAILY
Status: DISCONTINUED | OUTPATIENT
Start: 2024-09-13 | End: 2024-09-15 | Stop reason: HOSPADM

## 2024-09-13 RX ORDER — ENOXAPARIN SODIUM 100 MG/ML
60 INJECTION SUBCUTANEOUS EVERY 12 HOURS SCHEDULED
Status: CANCELLED | OUTPATIENT
Start: 2024-09-13

## 2024-09-13 RX ORDER — FUROSEMIDE 40 MG/1
20 TABLET ORAL DAILY
Status: DISCONTINUED | OUTPATIENT
Start: 2024-09-13 | End: 2024-09-15 | Stop reason: HOSPADM

## 2024-09-13 RX ORDER — KETOROLAC TROMETHAMINE 30 MG/ML
30 INJECTION, SOLUTION INTRAMUSCULAR; INTRAVENOUS ONCE
Status: COMPLETED | OUTPATIENT
Start: 2024-09-13 | End: 2024-09-13

## 2024-09-13 RX ORDER — MORPHINE SULFATE 2 MG/ML
2 INJECTION, SOLUTION INTRAMUSCULAR; INTRAVENOUS EVERY 4 HOURS PRN
Status: DISCONTINUED | OUTPATIENT
Start: 2024-09-13 | End: 2024-09-15 | Stop reason: HOSPADM

## 2024-09-13 RX ORDER — ALENDRONATE SODIUM 70 MG/1
70 TABLET ORAL
COMMUNITY

## 2024-09-13 RX ORDER — KETOROLAC TROMETHAMINE 30 MG/ML
15 INJECTION, SOLUTION INTRAMUSCULAR; INTRAVENOUS EVERY 6 HOURS PRN
Status: COMPLETED | OUTPATIENT
Start: 2024-09-13 | End: 2024-09-13

## 2024-09-13 SDOH — SOCIAL STABILITY: SOCIAL INSECURITY: DO YOU FEEL ANYONE HAS EXPLOITED OR TAKEN ADVANTAGE OF YOU FINANCIALLY OR OF YOUR PERSONAL PROPERTY?: NO

## 2024-09-13 SDOH — SOCIAL STABILITY: SOCIAL INSECURITY: ARE YOU OR HAVE YOU BEEN THREATENED OR ABUSED PHYSICALLY, EMOTIONALLY, OR SEXUALLY BY ANYONE?: NO

## 2024-09-13 SDOH — SOCIAL STABILITY: SOCIAL INSECURITY: ARE THERE ANY APPARENT SIGNS OF INJURIES/BEHAVIORS THAT COULD BE RELATED TO ABUSE/NEGLECT?: NO

## 2024-09-13 SDOH — SOCIAL STABILITY: SOCIAL INSECURITY: HAVE YOU HAD ANY THOUGHTS OF HARMING ANYONE ELSE?: NO

## 2024-09-13 SDOH — SOCIAL STABILITY: SOCIAL INSECURITY: ABUSE: ADULT

## 2024-09-13 SDOH — SOCIAL STABILITY: SOCIAL INSECURITY: DO YOU FEEL UNSAFE GOING BACK TO THE PLACE WHERE YOU ARE LIVING?: NO

## 2024-09-13 SDOH — SOCIAL STABILITY: SOCIAL INSECURITY: HAVE YOU HAD THOUGHTS OF HARMING ANYONE ELSE?: NO

## 2024-09-13 SDOH — SOCIAL STABILITY: SOCIAL INSECURITY: DOES ANYONE TRY TO KEEP YOU FROM HAVING/CONTACTING OTHER FRIENDS OR DOING THINGS OUTSIDE YOUR HOME?: NO

## 2024-09-13 SDOH — SOCIAL STABILITY: SOCIAL INSECURITY: HAS ANYONE EVER THREATENED TO HURT YOUR FAMILY OR YOUR PETS?: NO

## 2024-09-13 ASSESSMENT — PAIN DESCRIPTION - ORIENTATION
ORIENTATION: LEFT
ORIENTATION: LEFT

## 2024-09-13 ASSESSMENT — COGNITIVE AND FUNCTIONAL STATUS - GENERAL
DAILY ACTIVITIY SCORE: 24
MOBILITY SCORE: 24
PATIENT BASELINE BEDBOUND: NO

## 2024-09-13 ASSESSMENT — LIFESTYLE VARIABLES
HOW OFTEN DO YOU HAVE 6 OR MORE DRINKS ON ONE OCCASION: NEVER
SKIP TO QUESTIONS 9-10: 1
EVER FELT BAD OR GUILTY ABOUT YOUR DRINKING: NO
AUDIT-C TOTAL SCORE: 0
HAVE YOU EVER FELT YOU SHOULD CUT DOWN ON YOUR DRINKING: NO
EVER HAD A DRINK FIRST THING IN THE MORNING TO STEADY YOUR NERVES TO GET RID OF A HANGOVER: NO
HAVE PEOPLE ANNOYED YOU BY CRITICIZING YOUR DRINKING: NO
AUDIT-C TOTAL SCORE: 0
TOTAL SCORE: 0
HOW MANY STANDARD DRINKS CONTAINING ALCOHOL DO YOU HAVE ON A TYPICAL DAY: PATIENT DOES NOT DRINK
HOW OFTEN DO YOU HAVE A DRINK CONTAINING ALCOHOL: NEVER

## 2024-09-13 ASSESSMENT — PAIN DESCRIPTION - LOCATION
LOCATION: CHEST
LOCATION: ABDOMEN

## 2024-09-13 ASSESSMENT — PAIN - FUNCTIONAL ASSESSMENT
PAIN_FUNCTIONAL_ASSESSMENT: 0-10

## 2024-09-13 ASSESSMENT — ACTIVITIES OF DAILY LIVING (ADL)
GROOMING: INDEPENDENT
TOILETING: INDEPENDENT
FEEDING YOURSELF: INDEPENDENT
BATHING: INDEPENDENT
PATIENT'S MEMORY ADEQUATE TO SAFELY COMPLETE DAILY ACTIVITIES?: YES
HEARING - LEFT EAR: FUNCTIONAL
JUDGMENT_ADEQUATE_SAFELY_COMPLETE_DAILY_ACTIVITIES: YES
WALKS IN HOME: INDEPENDENT
DRESSING YOURSELF: INDEPENDENT
ADEQUATE_TO_COMPLETE_ADL: YES
LACK_OF_TRANSPORTATION: NO

## 2024-09-13 ASSESSMENT — PATIENT HEALTH QUESTIONNAIRE - PHQ9
2. FEELING DOWN, DEPRESSED OR HOPELESS: NOT AT ALL
SUM OF ALL RESPONSES TO PHQ9 QUESTIONS 1 & 2: 0
1. LITTLE INTEREST OR PLEASURE IN DOING THINGS: NOT AT ALL

## 2024-09-13 ASSESSMENT — PAIN SCALES - GENERAL
PAINLEVEL_OUTOF10: 7
PAINLEVEL_OUTOF10: 3
PAINLEVEL_OUTOF10: 0 - NO PAIN
PAINLEVEL_OUTOF10: 8
PAINLEVEL_OUTOF10: 5 - MODERATE PAIN
PAINLEVEL_OUTOF10: 5 - MODERATE PAIN

## 2024-09-13 ASSESSMENT — COLUMBIA-SUICIDE SEVERITY RATING SCALE - C-SSRS
2. HAVE YOU ACTUALLY HAD ANY THOUGHTS OF KILLING YOURSELF?: NO
1. IN THE PAST MONTH, HAVE YOU WISHED YOU WERE DEAD OR WISHED YOU COULD GO TO SLEEP AND NOT WAKE UP?: NO
1. IN THE PAST MONTH, HAVE YOU WISHED YOU WERE DEAD OR WISHED YOU COULD GO TO SLEEP AND NOT WAKE UP?: NO
6. HAVE YOU EVER DONE ANYTHING, STARTED TO DO ANYTHING, OR PREPARED TO DO ANYTHING TO END YOUR LIFE?: NO
2. HAVE YOU ACTUALLY HAD ANY THOUGHTS OF KILLING YOURSELF?: NO
6. HAVE YOU EVER DONE ANYTHING, STARTED TO DO ANYTHING, OR PREPARED TO DO ANYTHING TO END YOUR LIFE?: NO

## 2024-09-13 ASSESSMENT — PAIN DESCRIPTION - DESCRIPTORS: DESCRIPTORS: ACHING

## 2024-09-13 NOTE — ED PROVIDER NOTES
"HPI   Chief Complaint   Patient presents with    Chest Pain     Center chest pain that started 2 hours ago.\"       History provided by: Patient    Limitations to history: None    CC: Chest pain, abdominal pain    HPI: 63-year-old female with a history of osteoarthritis, GERD, rheumatoid arthritis presents the emergency department to be evaluated for abdominal pain and chest pain.  Patient states the chest pain started earlier today.  She characterized the pain as \"sharp \"and localized to the middle of her chest.  It is there intermittently and worse with breathing, she feels that she cannot take a big breath.  Denies cough or hemoptysis.  Denies fever and chills per denies runny nose congestion or sore throat.  She denies feeling short of breath.  She denies history of ACS, she is not sure when her last stress test was received and required stent placement or bypass.  Denies history of heart failure however she does take Lasix for peripheral edema.  She denies history of DVT or PE and denies recent plane flights, history of cancer, use of estrogen.  Denies history of asthma or COPD of use of breathing treatments.  She denies using tobacco.  Patient also states that starting earlier this morning she started having left lower quadrant and left flank pain.  She characterized the pain as \"sharp \"and states that the pain is there all the time.  She has not taken anything for pain prior to arrival.  Patient was seen in the emergency department in late August for a 5 mm left-sided stone.  She was discharged home however her pain and nausea were not controlled at home so she did require hospital admission and urology intervention where they did place a ureteral stent.  She had the stent removed on the 10th of this month and has not had any problems up until this morning.  Denies urgency frequency and dysuria.  Denies hematuria.  Denies nausea vomiting.  Denies all other systemic symptoms.    ROS: Negative unless mentioned " in HPI    Medical Hx: Allergies reviewed.  Immunizations up-to-date.    Physical exam:    Constitutional: Patient is obese and well-developed.  Appears to be uncomfortable but nontoxic in appearance.  Oriented to person, place, time, and situation.    HEENT: Head is normocephalic, atraumatic. Patient's airway is patent.  Tympanic membranes are clear bilaterally.  Nasal mucosa clear.  Mouth with normal mucosa.  Throat is not erythematous and there are no oropharyngeal exudates, uvula is midline.  No obvious facial deformities.    Eyes: Clear bilaterally.  Pupils are equal round and reactive to light and accommodation.  Extraocular movements intact.      Cardiac: Regular rate, regular rhythm.  Heart sounds S1, S2.  No murmurs, rubs, or gallops.  PMI nondisplaced.  No JVD.    Respiratory: Regular respiratory rate and effort.  Breath sounds are clear and equal bilaterally, no adventitious lung sounds.  Patient is speaking in full sentences and is in no apparent respiratory distress. No use of accessory muscles.      Gastrointestinal: Left lower quadrant and left upper quadrant tenderness to palpation.  Abdomen is soft and nondistended.   there are no obvious deformities.  No rebound tenderness or guarding.  Bowel sounds are normal active.    Genitourinary: Left CVA tenderness    Musculoskeletal: No reproducible tenderness.  No obvious skin or bony deformities.  Patient has equal range of motion in all extremities and no strength deficiencies.  No muscle or joint tenderness. No back or neck tenderness.  Capillary refill less than 3 seconds.  Strong peripheral pulses.  No sensory deficits.    Neurological: Patient is alert and oriented.  No focal deficits.  5/5 strength in all extremities.  Cranial nerves II through XII intact. GCS15.     Skin: Skin is normal color for race and is warm, dry, and intact.  No evidence of trauma.  No lesions, rashes, bruising, jaundice, or masses.    Psych: Appropriate mood and affect.  No  apparent risk to self or others.    Heme/lymph: No adenopathy, lymphadenopathy, or splenomegaly    Physical exam is otherwise negative unless stated above or in history of present illness.              Patient History   Past Medical History:   Diagnosis Date    Arthritis     COVID-19 06/17/2022    COVID-19 virus infection    GERD (gastroesophageal reflux disease)     Paroxysmal atrial fibrillation (Multi)     Paroxysmal atrial fibrillation    RA (rheumatoid arthritis) (Multi)      Past Surgical History:   Procedure Laterality Date    CYSTOSCOPY W/ LASER LITHOTRIPSY Left 09/02/2024    OTHER SURGICAL HISTORY  06/17/2022    Cholecystectomy    OTHER SURGICAL HISTORY  06/17/2022    Colposcopy    OTHER SURGICAL HISTORY  06/17/2022    Tubal ligation bilateral    OTHER SURGICAL HISTORY  06/17/2022    Complete colonoscopy    OTHER SURGICAL HISTORY  09/27/2022    Bariatric surgery    OTHER SURGICAL HISTORY  09/27/2022    Esophagogastroduodenoscopy     No family history on file.  Social History     Tobacco Use    Smoking status: Never     Passive exposure: Never    Smokeless tobacco: Never   Vaping Use    Vaping status: Never Used   Substance Use Topics    Alcohol use: Defer    Drug use: Never       Physical Exam   ED Triage Vitals [09/13/24 1426]   Temp Pulse Resp BP   -- -- -- --      SpO2 Temp Source Heart Rate Source Patient Position   -- Temporal Monitor Sitting      BP Location FiO2 (%)     Right arm --       Physical Exam      ED Course & MDM   Diagnoses as of 09/13/24 1722   Chest pain, unspecified type   Flank pain          Patient updated on plan for lab testing, IV insertion, radiology imaging, and medications to be administered while in the ER (if indicated). Patient updated on expected wait times for testing and results. Patient provided my name and told to ask any staff member for questions or concerns if they should arise. Electronic medical record reviewed.     MDM    Upon initial assessment, patient is obese  and appears to be uncomfortable but nontoxic in appearance.    Patient presented to the emergency department with the chief complaint abdominal pain and chest pain.  Left CVA tenderness.  Patient has left upper quadrant and left lower quadrant tenderness palpation but abdomen is soft and nondistended.  No muffled heart sounds, JVD, murmur.  No peripheral edema or erythema.  No neurological deficits.  On arrival to the emergency department, vital signs were within normal limits    Will give the patient IV morphine as well as 1/2 L of IV normal saline for her symptoms.  Will obtain basic blood work, EKG and troponin, urinalysis, CT abdomen pelvis, and a CTA to rule out a PE especially given her clear breath sounds and recent surgery.    EKG was performed at 1431 and interpreted by me.  Normal sinus rhythm 76 bpm.  Left axis deviation.  No ST ovation or depression.  No prolonged QT.  Overall looks similar to her previous that she has had especially May 2023.    Patient still complaining of pain after medications, will give her additional morphine.  Her troponins are stable making ACS unlikely.  BNP is 64.  Lipase is 24.  CK is 36.  Lactate is 1.2.  CBC shows a normocytic anemia similar to baseline.  Urinalysis reveals blood but no obvious signs of urinary tract infection.  CMP shows that her kidney function is greatly improving from when she was admitted to the hospital.  CTA reveals no PE however the patient has her old 5 mm stone in the left ureter and a new 4 mm stone in the left ureter.  There is some hydroureteronephrosis noted.  I spoke to Dr. Valentin with urology who recommend admitting the patient If her pain is intractable and urology will consult in the morning.  Patient does not feel comfortable going home especially since she failed outpatient treatment the last time she had a kidney stone.  I spoke to the hospitalist who is agreeable to admit the patient for observation, hydration, pain management, and  urology consultation and potential intervention tomorrow morning.  Patient verbalized understanding and agreement with the treatment plan.    This note was dictated using a speech recognition program.  While an attempt was made at proof-reading to minimize errors, minor errors in transcription may be present       No data recorded                                 Medical Decision Making      Procedure  Procedures     Hawk Valdez PA-C  09/13/24 4925

## 2024-09-13 NOTE — H&P
History and Physical        ASSESSMENT AND PLAN:       4 mm obstructive kidney stone  Left-sided hydronephrosis  -P/W abdominal pain  -Similar admission in August for a 5 mm kidney stone, underwent stent removal last week.  -CT abdomen and pelvis shows persistent 5 mm kidney stone, new 4 mm kidney stone with hydronephrosis.-Continue pain control  -IV fluids  -IV ceftriaxone  -N.p.o., urology consulted    Chronic lower extremity edema  -Continue Lasix    Morbid obese        VTE Prophylaxis: SCD, hold Lovenox       Amitna Mckenzie MD    HISTORY OF PRESENT ILLNESS:   Chief Complaint: Abdominal pain chest pain    History Of Present Illness:    Kristy Correa is a 63 y.o. female with a significant past medical history of rheumatoid arthritis, osteoarthritis, gastroesophageal reflux disease who presented to the emergency room with abdominal pain and chest pain.    She had a recent hospitalization here for kidney stone, she underwent a stent placement with laser lithotripsy and had her stent removed last week.  Subsequently after that she stated that she started having chest pain and abdominal pain.    CT abdomen shows old 5 mm stone and a new 4 mm stone in left ureter with hydronephrosis.  This was discussed with urology.    CT angio of chest shows no evidence of PE    Currently admits to having some abdominal pain, denies chest pain.       Review of systems: 10 point review of systems is otherwise negative except as mentioned above.    PAST HISTORIES:       Past Medical History:  She has a past medical history of Arthritis, COVID-19 (06/17/2022), GERD (gastroesophageal reflux disease), Paroxysmal atrial fibrillation (Multi), and RA (rheumatoid arthritis) (Multi).    Past Surgical History:  She has a past surgical history that includes Other surgical history (06/17/2022); Other surgical history (06/17/2022); Other surgical history (06/17/2022); Other surgical history (06/17/2022); Other surgical history  "(09/27/2022); Other surgical history (09/27/2022); and Cystoscopy w/ laser lithotripsy (Left, 09/02/2024).      Social History:  She reports that she has never smoked. She has never been exposed to tobacco smoke. She has never used smokeless tobacco. Alcohol use questions deferred to the physician. She reports that she does not use drugs.    Family History:  No family history on file.     Allergies:  Hydrocortisone, Fentanyl, Fexofenadine, Hydromorphone, Methylprednisolone, Tetanus vaccines and toxoid, and Azithromycin    OBJECTIVE:       Last Recorded Vitals:  Vitals:    09/13/24 1426 09/13/24 1629   BP: 167/75 141/69   BP Location: Right arm Left arm   Patient Position: Sitting Sitting   Pulse: 80 86   Resp: 20 20   Temp: 36.4 °C (97.5 °F)    TempSrc: Temporal    SpO2: 97% 96%   Weight: (!) 167 kg (369 lb)    Height: 1.626 m (5' 4\")        Last I/O:  No intake/output data recorded.    Physical Exam      PHYSICAL EXAM:   GENERAL: Laying in bed, does not appear to be in any distress.   HEENT: HEAD: Normocephalic atraumatic.  Neck: Supple.  Eyes: Pupils are reactive to direct light.   CVS: S1, S2 heard. Regular rate and rhythm  LUNGS: Clear to auscultate bilaterally. No wheezing or rhonchi appreciated.  ABDOMEN: Soft, nontender to palpate. Positive bowel sounds. No guarding or rebound appreciated.  NEUROLOGICAL: No focal neurological deficits appreciated. Cranial nerves are grossly intact.  EXTREMITIES: Dorsalis pedis pulses can be appreciated.  Lower extremity edema appreciated.    SKIN:  Grossly intact, warm and dry.        Scheduled Medications    PRN Medications    Continuous Medications      Outpatient Medications:  Prior to Admission medications    Medication Sig Start Date End Date Taking? Authorizing Provider   B complex-vitamin C-folic acid (Nephro-La Rx) 1- mg-mg-mcg tablet Take 1 tablet by mouth once daily with breakfast.    Historical Provider, MD   cephalexin (Keflex) 500 mg capsule Take 1 " capsule (500 mg) by mouth 2 times a day for 5 days. 9/2/24 9/7/24  Aminta Mckenzie MD   cholecalciferol (Vitamin D-3) 5,000 Units tablet Take 1 tablet (5,000 Units) by mouth once daily.    Historical Provider, MD   furosemide (Lasix) 20 mg tablet Take 1 tablet (20 mg) by mouth once daily.    Historical Provider, MD   pantoprazole (ProtoNix) 20 mg EC tablet Take 2 tablets (40 mg) by mouth 2 times a day. Do not crush, chew, or split.    Historical Provider, MD   predniSONE (Deltasone) 10 mg tablet Take 1 tablet (10 mg) by mouth once daily.    Historical Provider, MD       LABS AND IMAGING:     Labs:  Results for orders placed or performed during the hospital encounter of 09/13/24 (from the past 24 hour(s))   ECG 12 lead   Result Value Ref Range    Ventricular Rate 76 BPM    Atrial Rate 76 BPM    WI Interval 184 ms    QRS Duration 96 ms    QT Interval 388 ms    QTC Calculation(Bazett) 436 ms    P Axis 71 degrees    R Axis -33 degrees    T Axis 38 degrees    QRS Count 12 beats    Q Onset 210 ms    P Onset 118 ms    P Offset 159 ms    T Offset 404 ms    QTC Fredericia 419 ms   Urinalysis with Reflex Culture and Microscopic   Result Value Ref Range    Color, Urine Colorless (N) Light-Yellow, Yellow, Dark-Yellow    Appearance, Urine Clear Clear    Specific Gravity, Urine 1.005 1.005 - 1.035    pH, Urine 5.5 5.0, 5.5, 6.0, 6.5, 7.0, 7.5, 8.0    Protein, Urine NEGATIVE NEGATIVE, 10 (TRACE), 20 (TRACE) mg/dL    Glucose, Urine Normal Normal mg/dL    Blood, Urine 0.06 (1+) (A) NEGATIVE    Ketones, Urine NEGATIVE NEGATIVE mg/dL    Bilirubin, Urine NEGATIVE NEGATIVE    Urobilinogen, Urine Normal Normal mg/dL    Nitrite, Urine NEGATIVE NEGATIVE    Leukocyte Esterase, Urine NEGATIVE NEGATIVE   Urinalysis Microscopic   Result Value Ref Range    WBC, Urine 1-5 1-5, NONE /HPF    RBC, Urine 3-5 NONE, 1-2, 3-5 /HPF    Hyaline Casts, Urine OCCASIONAL (A) NONE /LPF   CBC and Auto Differential   Result Value Ref Range    WBC  10.9 4.4 - 11.3 x10*3/uL    nRBC 0.0 0.0 - 0.0 /100 WBCs    RBC 4.27 4.00 - 5.20 x10*6/uL    Hemoglobin 10.0 (L) 12.0 - 16.0 g/dL    Hematocrit 34.4 (L) 36.0 - 46.0 %    MCV 81 80 - 100 fL    MCH 23.4 (L) 26.0 - 34.0 pg    MCHC 29.1 (L) 32.0 - 36.0 g/dL    RDW 16.8 (H) 11.5 - 14.5 %    Platelets 353 150 - 450 x10*3/uL    Neutrophils % 84.5 40.0 - 80.0 %    Immature Granulocytes %, Automated 0.9 0.0 - 0.9 %    Lymphocytes % 6.4 13.0 - 44.0 %    Monocytes % 7.2 2.0 - 10.0 %    Eosinophils % 0.6 0.0 - 6.0 %    Basophils % 0.4 0.0 - 2.0 %    Neutrophils Absolute 9.20 (H) 1.20 - 7.70 x10*3/uL    Immature Granulocytes Absolute, Automated 0.10 0.00 - 0.70 x10*3/uL    Lymphocytes Absolute 0.70 (L) 1.20 - 4.80 x10*3/uL    Monocytes Absolute 0.79 0.10 - 1.00 x10*3/uL    Eosinophils Absolute 0.07 0.00 - 0.70 x10*3/uL    Basophils Absolute 0.04 0.00 - 0.10 x10*3/uL   Comprehensive metabolic panel   Result Value Ref Range    Glucose 104 (H) 74 - 99 mg/dL    Sodium 140 136 - 145 mmol/L    Potassium 3.6 3.5 - 5.3 mmol/L    Chloride 104 98 - 107 mmol/L    Bicarbonate 27 21 - 32 mmol/L    Anion Gap 13 10 - 20 mmol/L    Urea Nitrogen 19 6 - 23 mg/dL    Creatinine 1.14 (H) 0.50 - 1.05 mg/dL    eGFR 55 (L) >60 mL/min/1.73m*2    Calcium 9.2 8.6 - 10.3 mg/dL    Albumin 4.1 3.4 - 5.0 g/dL    Alkaline Phosphatase 62 33 - 136 U/L    Total Protein 7.3 6.4 - 8.2 g/dL    AST 18 9 - 39 U/L    Bilirubin, Total 0.7 0.0 - 1.2 mg/dL    ALT 16 7 - 45 U/L   Coagulation Screen   Result Value Ref Range    Protime 12.3 9.8 - 12.8 seconds    INR 1.1 0.9 - 1.1    aPTT 33 27 - 38 seconds   B-Type Natriuretic Peptide   Result Value Ref Range    BNP 64 0 - 99 pg/mL   Troponin I, High Sensitivity, Initial   Result Value Ref Range    Troponin I, High Sensitivity 6 0 - 13 ng/L   Lactate   Result Value Ref Range    Lactate 1.2 0.4 - 2.0 mmol/L   Lipase   Result Value Ref Range    Lipase 24 9 - 82 U/L   Creatine Kinase   Result Value Ref Range    Creatine Kinase  36 0 - 215 U/L   Troponin, High Sensitivity, 1 Hour   Result Value Ref Range    Troponin I, High Sensitivity 8 0 - 13 ng/L        Imaging:  ECG 12 lead  Normal sinus rhythm  Left axis deviation  Possible Lateral infarct , age undetermined  Abnormal ECG  When compared with ECG of 13-SEP-2024 11:48, (unconfirmed)  Borderline criteria for Lateral infarct are now Present  ST no longer elevated in Inferior leads  Nonspecific T wave abnormality now evident in Anterior leads  CT abdomen pelvis w IV contrast, CT angio chest for pulmonary embolism  Narrative: Interpreted By:  Kurtis Galvez,   STUDY:  CT ANGIO CHEST FOR PULMONARY EMBOLISM; CT ABDOMEN PELVIS W IV  CONTRAST;  9/13/2024 4:21 pm      INDICATION:  Signs/Symptoms:SOB; Signs/Symptoms:L sided abd and flank pain.      COMPARISON:  Chest CT dated 12/15/2023. CT abdomen dated 09/01/2024.      ACCESSION NUMBER(S):  [id number]; [id number]      ORDERING CLINICIAN:  LAURIE RAMON      TECHNIQUE:  Helical data acquisition of the chest was obtained in the arterial  phase. Contrast volume: 75 mL IV contrast Omnipaque 350. Subsequently  CT of abdomen pelvis was obtained in the late portal venous phase.      Axial contiguous images were reformatted in coronal and sagittal  planes. Axial and coronal MIP images were created and reviewed.      FINDINGS:  POTENTIAL LIMITATIONS OF THE STUDY: Motion and mixing artifact which  limits evaluation of the distal branch vessels.      The visualized thyroid gland is within normal limits.      HEART AND VESSELS:  No discrete filling defects within the main pulmonary artery or its  branches.      Main pulmonary artery and its branches are normal in caliber.      The thoracic aorta is of normal course and caliber without vascular  calcifications.      No coronary artery calcifications are seen.The study is not optimized  for evaluation of coronary arteries.      The cardiac chambers are not enlarged.      No evidence of pericardial  effusion.      MEDIASTINUM AND ANTONIO, AND AXILLA:  No evidence of thoracic lymphadenopathy by CT criteria.      No mediastinal masses.      LUNGS AND AIRWAYS:  The trachea and central airways are patent. No endobronchial lesion.      There is bibasilar linear scarring versus atelectasis. Lungs are  otherwise clear. There is no pleural effusion or pneumothorax.      ABDOMEN/PELVIS:  Liver: The liver is enlarged and normal in contour. There are no  focal hepatic lesions. Gallbladder/biliary system: There gallbladder  is surgically absent. There is no intrahepatic or extrahepatic  biliary dilatation. Spleen: Normal in size.  Pancreas: Not enlarged. No peripancreatic edema or ductal dilatation.  No pancreatic mass is identified. Adrenals: Within normal limits.  Kidneys: There is persistent 5 mm calculus in the proximal left  ureter and an additional new 4 mm calculus in the proximal left  ureter just beyond the ureteropelvic junction, axial images 75 and  77. There is also nonobstructing calculus in the left renal pelvis  and and additional nonobstructing intrarenal calculi in the left  kidney. There is similar mild-to-moderate left hydronephrosis. No  hydronephrosis or nephrolithiasis on the right. No significant  perinephric edema. Urinary bladder: Grossly normal.  Bowel: There is a moderate size hiatal hernia and there is  postsurgical change to the stomach suggestive of sleeve gastrectomy,  similar to prior. Stomach is partially collapsed without evidence of  wall thickening. No bowel dilatation or obstruction. Normal appendix.  Partially collapsed large bowel without wall thickening or acute  inflammatory change. Mild sigmoid diverticulosis without  diverticulitis. Pelvis: Uterus is present. No evidence of adnexal  mass. Vessels: The abdominal aorta is normal in caliber. Mesenteric  vessels appear grossly stable with limited evaluation due to phase of  the contrast. Lymph nodes: No lymphadenopathy in the abdomen  or  pelvis. Peritoneal/retroperitoneum: There is no evidence of  intraperitoneal free air. There is no retroperitoneal hematoma. There  are no pelvic or mesenteric masses identified.      OSSEOUS STRUCTURES:  There are no suspicious osseous lesions. Vertebral bodies are intact.  There is ventral abdominal wall scarring and small ventral abdominal  wall fat containing hernia.      Impression: 1.  No evidence of pulmonary emboli to the proximal segmental level.  Suboptimal contrast bolus and mixing artifact limits evaluation of  the distal segmental and subsegmental branches.  2. Persistent obstructing 5 mm calculus in the proximal left ureter  and an additional new 4 mm calculus in the proximal left ureter just  beyond the ureteropelvic junction. Similar mild-to-moderate left  hydroureteronephrosis.          Signed by: Kurtis Galvez 9/13/2024 4:43 PM  Dictation workstation:   [id number]

## 2024-09-14 ENCOUNTER — ANESTHESIA EVENT (OUTPATIENT)
Dept: OPERATING ROOM | Facility: HOSPITAL | Age: 64
End: 2024-09-14

## 2024-09-14 ENCOUNTER — ANESTHESIA (OUTPATIENT)
Dept: OPERATING ROOM | Facility: HOSPITAL | Age: 64
End: 2024-09-14

## 2024-09-14 ENCOUNTER — APPOINTMENT (OUTPATIENT)
Dept: RADIOLOGY | Facility: HOSPITAL | Age: 64
End: 2024-09-14
Payer: COMMERCIAL

## 2024-09-14 LAB
ANION GAP SERPL CALC-SCNC: 14 MMOL/L (ref 10–20)
BUN SERPL-MCNC: 22 MG/DL (ref 6–23)
CALCIUM SERPL-MCNC: 8.7 MG/DL (ref 8.6–10.3)
CHLORIDE SERPL-SCNC: 104 MMOL/L (ref 98–107)
CO2 SERPL-SCNC: 25 MMOL/L (ref 21–32)
CREAT SERPL-MCNC: 1.85 MG/DL (ref 0.5–1.05)
EGFRCR SERPLBLD CKD-EPI 2021: 31 ML/MIN/1.73M*2
GLUCOSE SERPL-MCNC: 124 MG/DL (ref 74–99)
HOLD SPECIMEN: NORMAL
POTASSIUM SERPL-SCNC: 4.8 MMOL/L (ref 3.5–5.3)
SODIUM SERPL-SCNC: 138 MMOL/L (ref 136–145)

## 2024-09-14 PROCEDURE — 2500000004 HC RX 250 GENERAL PHARMACY W/ HCPCS (ALT 636 FOR OP/ED): Performed by: HOSPITALIST

## 2024-09-14 PROCEDURE — G0378 HOSPITAL OBSERVATION PER HR: HCPCS

## 2024-09-14 PROCEDURE — 99232 SBSQ HOSP IP/OBS MODERATE 35: CPT | Performed by: HOSPITALIST

## 2024-09-14 PROCEDURE — 2500000001 HC RX 250 WO HCPCS SELF ADMINISTERED DRUGS (ALT 637 FOR MEDICARE OP): Performed by: STUDENT IN AN ORGANIZED HEALTH CARE EDUCATION/TRAINING PROGRAM

## 2024-09-14 PROCEDURE — 1210000001 HC SEMI-PRIVATE ROOM DAILY

## 2024-09-14 PROCEDURE — 96376 TX/PRO/DX INJ SAME DRUG ADON: CPT | Mod: 59

## 2024-09-14 PROCEDURE — 80048 BASIC METABOLIC PNL TOTAL CA: CPT | Performed by: HOSPITALIST

## 2024-09-14 PROCEDURE — 96361 HYDRATE IV INFUSION ADD-ON: CPT

## 2024-09-14 PROCEDURE — 36415 COLL VENOUS BLD VENIPUNCTURE: CPT | Performed by: HOSPITALIST

## 2024-09-14 PROCEDURE — 96375 TX/PRO/DX INJ NEW DRUG ADDON: CPT

## 2024-09-14 PROCEDURE — 2500000001 HC RX 250 WO HCPCS SELF ADMINISTERED DRUGS (ALT 637 FOR MEDICARE OP): Performed by: HOSPITALIST

## 2024-09-14 RX ORDER — KETOROLAC TROMETHAMINE 30 MG/ML
15 INJECTION, SOLUTION INTRAMUSCULAR; INTRAVENOUS ONCE
Status: COMPLETED | OUTPATIENT
Start: 2024-09-14 | End: 2024-09-14

## 2024-09-14 RX ORDER — FUROSEMIDE 10 MG/ML
20 INJECTION INTRAMUSCULAR; INTRAVENOUS ONCE
Status: COMPLETED | OUTPATIENT
Start: 2024-09-14 | End: 2024-09-14

## 2024-09-14 RX ORDER — SODIUM CHLORIDE 9 MG/ML
50 INJECTION, SOLUTION INTRAVENOUS CONTINUOUS
Status: DISCONTINUED | OUTPATIENT
Start: 2024-09-14 | End: 2024-09-15

## 2024-09-14 RX ORDER — KETOROLAC TROMETHAMINE 30 MG/ML
15 INJECTION, SOLUTION INTRAMUSCULAR; INTRAVENOUS 3 TIMES DAILY PRN
Status: DISCONTINUED | OUTPATIENT
Start: 2024-09-14 | End: 2024-09-15

## 2024-09-14 RX ORDER — SODIUM CHLORIDE, SODIUM LACTATE, POTASSIUM CHLORIDE, CALCIUM CHLORIDE 600; 310; 30; 20 MG/100ML; MG/100ML; MG/100ML; MG/100ML
100 INJECTION, SOLUTION INTRAVENOUS CONTINUOUS
Status: CANCELLED | OUTPATIENT
Start: 2024-09-14

## 2024-09-14 SDOH — ECONOMIC STABILITY: INCOME INSECURITY: IN THE LAST 12 MONTHS, WAS THERE A TIME WHEN YOU WERE NOT ABLE TO PAY THE MORTGAGE OR RENT ON TIME?: NO

## 2024-09-14 SDOH — ECONOMIC STABILITY: FOOD INSECURITY: WITHIN THE PAST 12 MONTHS, YOU WORRIED THAT YOUR FOOD WOULD RUN OUT BEFORE YOU GOT MONEY TO BUY MORE.: NEVER TRUE

## 2024-09-14 SDOH — ECONOMIC STABILITY: FOOD INSECURITY: WITHIN THE PAST 12 MONTHS, THE FOOD YOU BOUGHT JUST DIDN'T LAST AND YOU DIDN'T HAVE MONEY TO GET MORE.: NEVER TRUE

## 2024-09-14 SDOH — ECONOMIC STABILITY: HOUSING INSECURITY: IN THE PAST 12 MONTHS HAS THE ELECTRIC, GAS, OIL, OR WATER COMPANY THREATENED TO SHUT OFF SERVICES IN YOUR HOME?: NO

## 2024-09-14 SDOH — ECONOMIC STABILITY: HOUSING INSECURITY: IN THE LAST 12 MONTHS, HOW MANY PLACES HAVE YOU LIVED?: 1

## 2024-09-14 SDOH — ECONOMIC STABILITY: GENERAL

## 2024-09-14 SDOH — ECONOMIC STABILITY: TRANSPORTATION INSECURITY: IN THE PAST 12 MONTHS, HAS LACK OF TRANSPORTATION KEPT YOU FROM MEDICAL APPOINTMENTS OR FROM GETTING MEDICATIONS?: NO

## 2024-09-14 SDOH — ECONOMIC STABILITY: HOUSING INSECURITY
IN THE LAST 12 MONTHS, WAS THERE A TIME WHEN YOU DID NOT HAVE A STEADY PLACE TO SLEEP OR SLEPT IN A SHELTER (INCLUDING NOW)?: NO

## 2024-09-14 SDOH — ECONOMIC STABILITY: TRANSPORTATION INSECURITY

## 2024-09-14 SDOH — ECONOMIC STABILITY: HOUSING INSECURITY: IN THE LAST 12 MONTHS, WAS THERE A TIME WHEN YOU WERE NOT ABLE TO PAY THE MORTGAGE OR RENT ON TIME?: NO

## 2024-09-14 SDOH — ECONOMIC STABILITY: FOOD INSECURITY: WITHIN THE PAST 12 MONTHS, YOU WORRIED THAT YOUR FOOD WOULD RUN OUT BEFORE YOU GOT THE MONEY TO BUY MORE.: NEVER TRUE

## 2024-09-14 SDOH — ECONOMIC STABILITY: FOOD INSECURITY

## 2024-09-14 SDOH — ECONOMIC STABILITY: HOUSING INSECURITY

## 2024-09-14 ASSESSMENT — ACTIVITIES OF DAILY LIVING (ADL)
HOW_WELL_CAN_YOU_DRESS_YOURSELF: INDEPENDENTLY
HOW_WELL_CAN_YOU_BATHE_YOURSELF: INDEPENDENTLY
LACK_OF_TRANSPORTATION: NO
ADL_BEFORE_ADMISSION: RIGHT
HOW_WELL_CAN_YOU_FEED_YOURSELF: INDEPENDENTLY
ADEQUATE_TO_COMPLETE_ADL: YES
HOW_WELL_CAN_YOU_USE_BATHROOM_BY_YOURSELF: INDEPENDENTLY
DRESSING: INDEPENDENT
ASSISTIVE_DEVICES: EYEGLASSES
ADL_BEFORE_ADMISSION: INDEPENDENTLY
TOILETING: INDEPENDENT
ASSISTIVE_DEVICES: OTHER
ADL_BEFORE_ADMISSION: BOTH
HOW_WELL_CAN_YOU_COMPLETE_GROOMING_TASKS: INDEPENDENTLY
ADEQUATE_TO_COMPLETE_ADL: YES
ADL_BEFORE_ADMISSION: BOTH
FEEDING: INDEPENDENT
WALKS_IN_HOME: INDEPENDENTLY
BATHING: INDEPENDENT
ASSISTIVE_DEVICE: CANE;EYEGLASSES;OTHER (COMMENT)
HEARING_LEFT_EAR: DIFFICULTY WITH NOISE
HEARING_RIGHT_EAR: DIFFICULTY WITH NOISE

## 2024-09-14 ASSESSMENT — COGNITIVE AND FUNCTIONAL STATUS - GENERAL
DAILY ACTIVITIY SCORE: 24
MOBILITY SCORE: 24

## 2024-09-14 ASSESSMENT — SOCIAL DETERMINANTS OF HEALTH (SDOH): IN THE PAST 12 MONTHS, HAS THE ELECTRIC, GAS, OIL, OR WATER COMPANY THREATENED TO SHUT OFF SERVICE IN YOUR HOME?: NO

## 2024-09-14 ASSESSMENT — PAIN SCALES - GENERAL
PAINLEVEL_OUTOF10: 4
PAINLEVEL_OUTOF10: 3
PAINLEVEL_OUTOF10: 4

## 2024-09-14 ASSESSMENT — PAIN DESCRIPTION - LOCATION: LOCATION: BACK

## 2024-09-14 ASSESSMENT — PAIN - FUNCTIONAL ASSESSMENT: PAIN_FUNCTIONAL_ASSESSMENT: 0-10

## 2024-09-14 NOTE — ANESTHESIA PREPROCEDURE EVALUATION
Patient: Kristy Correa    Procedure Information       Date/Time: 09/14/24 2204    Procedure: CYSTOSCOPY WITH LASER LITHOTRIPSY (Left)    Location: [facility] / [facility]    Surgeons: Mahesh Valentin MD            Relevant Problems   Cardiac   (+) Atrial fibrillation (Multi)      GI   (+) Gastroesophageal reflux disease      /Renal   (+) Kidney stones      Endocrine   (+) Class 3 severe obesity with body mass index (BMI) of 60.0 to 69.9 in adult (Multi)      Musculoskeletal   (+) Rheumatoid arthritis (Multi)       Clinical information reviewed:    Allergies  Meds               NPO Detail:  No data recorded     Physical Exam    Airway  Mallampati: II     Cardiovascular   Rhythm: regular  Rate: normal     Dental    Pulmonary - normal exam     Abdominal - normal exam             Anesthesia Plan    History of general anesthesia?: yes  History of complications of general anesthesia?: no    ASA 3     general     intravenous induction   Postoperative administration of opioids is intended.  Anesthetic plan and risks discussed with patient.

## 2024-09-14 NOTE — PROGRESS NOTES
"PROGRESS NOTE    ASSESSMENT AND PLAN:      4 mm obstructive kidney stone  Left-sided hydronephrosis  -P/W abdominal pain  -Similar admission in August for a 5 mm kidney stone, underwent stent removal last week.  -CT abdomen and pelvis shows persistent 5 mm kidney stone, new 4 mm kidney stone with hydronephrosis.  -Continue pain control  -IV fluids  -IV ceftriaxone  -N.p.o., urology consulted     Chronic lower extremity edema  -Continue Lasix     Morbid obese  Rheumatoid arthritis        SUBJECTIVE:   Admit Date: 9/13/2024    Interval History: Complains of pain, lower extremity swelling otherwise no active complaints.      OBJECTIVE:   Vitals: /60   Pulse 79   Temp 36.5 °C (97.7 °F)   Resp 18   Ht 1.626 m (5' 4\")   Wt (!) 167 kg (369 lb)   SpO2 96%   BMI 63.34 kg/m²    Wt Readings from Last 3 Encounters:   09/13/24 (!) 167 kg (369 lb)   09/01/24 (!) 167 kg (369 lb)   08/28/24 (!) 168 kg (370 lb)      24HR INTAKE/OUTPUT:    Intake/Output Summary (Last 24 hours) at 9/14/2024 1438  Last data filed at 9/14/2024 1321  Gross per 24 hour   Intake 1014.17 ml   Output --   Net 1014.17 ml       PHYSICAL EXAM:   GENERAL: Laying in bed, does not appear to be in any distress.   HEENT: HEAD: Normocephalic atraumatic.  Neck: Supple.  Eyes: Pupils are reactive to direct light.   CVS: S1, S2 heard. Regular rate and rhythm  LUNGS: Clear to auscultate bilaterally. No wheezing or rhonchi appreciated.  ABDOMEN: Soft, nontender to palpate. Positive bowel sounds. No guarding or rebound appreciated.  NEUROLOGICAL: No focal neurological deficits appreciated. Cranial nerves are grossly intact.  EXTREMITIES: Lower extremity edema appreciated.  SKIN:  Grossly intact, warm and dry.      LABS/IMAGING AND MEDICATIONS:   Scheduled Meds:cefTRIAXone, 1 g, intravenous, q24h  furosemide, 20 mg, oral, Daily  pantoprazole, 40 mg, oral, BID  potassium citrate CR, 10 mEq, oral, TID  predniSONE, 10 mg, oral, Daily      PRN Meds:PRN medications: " "ketorolac, morphine    No lab exists for component: \"CBC\"   No lab exists for component: \"CMP\"   No lab exists for component: \"TROPONIN\"  Results from last 7 days   Lab Units 09/13/24  1500   BNP pg/mL 64     Results from last 7 days   Lab Units 09/13/24  1500   INR  1.1     No lab exists for component: \"LIPIDS\"       No lab exists for component: \"URINALYSIS\"          BMP:  Results from last 7 days   Lab Units 09/13/24  1500   SODIUM mmol/L 140   POTASSIUM mmol/L 3.6   CHLORIDE mmol/L 104   CO2 mmol/L 27   BUN mg/dL 19   CREATININE mg/dL 1.14*       CBC:  Results from last 7 days   Lab Units 09/13/24  1500   WBC AUTO x10*3/uL 10.9   RBC AUTO x10*6/uL 4.27   HEMOGLOBIN g/dL 10.0*   HEMATOCRIT % 34.4*   MCV fL 81   MCH pg 23.4*   MCHC g/dL 29.1*   RDW % 16.8*   PLATELETS AUTO x10*3/uL 353       Cardiac Enzymes:   Results from last 7 days   Lab Units 09/13/24  1559 09/13/24  1500   TROPHS ng/L 8 6   CK TOTAL U/L  --  36         Hepatic Function Panel:  Results from last 7 days   Lab Units 09/13/24  1500   ALK PHOS U/L 62   ALT U/L 16   AST U/L 18   PROTEIN TOTAL g/dL 7.3   BILIRUBIN TOTAL mg/dL 0.7       Magnesium:        Pro-BNP:  No results found for: \"PROBNP\"    INR:  Results from last 7 days   Lab Units 09/13/24  1500   PROTIME seconds 12.3   INR  1.1       TSH:  No results found for: \"TSH\"    Lipid Profile:        No lab exists for component: \"LABVLDL\"    HgbA1C:        Lactate Level:  No lab exists for component: \"LACTA\"    CMP:  Results from last 7 days   Lab Units 09/13/24  1500   SODIUM mmol/L 140   POTASSIUM mmol/L 3.6   CHLORIDE mmol/L 104   CO2 mmol/L 27   BUN mg/dL 19   CREATININE mg/dL 1.14*   GLUCOSE mg/dL 104*   CALCIUM mg/dL 9.2   PROTEIN TOTAL g/dL 7.3   BILIRUBIN TOTAL mg/dL 0.7   ALK PHOS U/L 62   AST U/L 18   ALT U/L 16       Amylase:        Lipase:  Results from last 7 days   Lab Units 09/13/24  1500   LIPASE U/L 24       ABG:        No lab exists for component: \"PO2\", \"PCO2\", \"HCO3\", \"BE\", " "\"O2SAT\"       "

## 2024-09-15 ENCOUNTER — ANESTHESIA (OUTPATIENT)
Dept: OPERATING ROOM | Facility: HOSPITAL | Age: 64
End: 2024-09-15
Payer: COMMERCIAL

## 2024-09-15 ENCOUNTER — ANESTHESIA EVENT (OUTPATIENT)
Dept: OPERATING ROOM | Facility: HOSPITAL | Age: 64
End: 2024-09-15
Payer: COMMERCIAL

## 2024-09-15 ENCOUNTER — APPOINTMENT (OUTPATIENT)
Dept: RADIOLOGY | Facility: HOSPITAL | Age: 64
End: 2024-09-15
Payer: COMMERCIAL

## 2024-09-15 VITALS
BODY MASS INDEX: 50.02 KG/M2 | DIASTOLIC BLOOD PRESSURE: 60 MMHG | HEART RATE: 72 BPM | RESPIRATION RATE: 16 BRPM | TEMPERATURE: 97.9 F | WEIGHT: 293 LBS | OXYGEN SATURATION: 95 % | SYSTOLIC BLOOD PRESSURE: 122 MMHG | HEIGHT: 64 IN

## 2024-09-15 PROBLEM — N17.9 ACUTE KIDNEY INJURY (NONTRAUMATIC) (CMS-HCC): Status: ACTIVE | Noted: 2024-09-15

## 2024-09-15 PROBLEM — D64.9 ANEMIA: Status: ACTIVE | Noted: 2024-09-15

## 2024-09-15 LAB
ANION GAP SERPL CALC-SCNC: 12 MMOL/L (ref 10–20)
BUN SERPL-MCNC: 24 MG/DL (ref 6–23)
CALCIUM SERPL-MCNC: 7.9 MG/DL (ref 8.6–10.3)
CHLORIDE SERPL-SCNC: 106 MMOL/L (ref 98–107)
CO2 SERPL-SCNC: 26 MMOL/L (ref 21–32)
CREAT SERPL-MCNC: 1.8 MG/DL (ref 0.5–1.05)
EGFRCR SERPLBLD CKD-EPI 2021: 32 ML/MIN/1.73M*2
ERYTHROCYTE [DISTWIDTH] IN BLOOD BY AUTOMATED COUNT: 17.2 % (ref 11.5–14.5)
GLUCOSE SERPL-MCNC: 87 MG/DL (ref 74–99)
HCT VFR BLD AUTO: 30.1 % (ref 36–46)
HCT VFR BLD AUTO: 31.5 % (ref 36–46)
HGB BLD-MCNC: 8.7 G/DL (ref 12–16)
HGB BLD-MCNC: 9.1 G/DL (ref 12–16)
MAGNESIUM SERPL-MCNC: 1.82 MG/DL (ref 1.6–2.4)
MCH RBC QN AUTO: 23.1 PG (ref 26–34)
MCHC RBC AUTO-ENTMCNC: 28.9 G/DL (ref 32–36)
MCV RBC AUTO: 80 FL (ref 80–100)
NRBC BLD-RTO: 0 /100 WBCS (ref 0–0)
PLATELET # BLD AUTO: 274 X10*3/UL (ref 150–450)
POTASSIUM SERPL-SCNC: 3.8 MMOL/L (ref 3.5–5.3)
RBC # BLD AUTO: 3.77 X10*6/UL (ref 4–5.2)
SODIUM SERPL-SCNC: 140 MMOL/L (ref 136–145)
WBC # BLD AUTO: 7 X10*3/UL (ref 4.4–11.3)

## 2024-09-15 PROCEDURE — 2500000005 HC RX 250 GENERAL PHARMACY W/O HCPCS: Performed by: ANESTHESIOLOGY

## 2024-09-15 PROCEDURE — 99238 HOSP IP/OBS DSCHRG MGMT 30/<: CPT | Performed by: HOSPITALIST

## 2024-09-15 PROCEDURE — G0378 HOSPITAL OBSERVATION PER HR: HCPCS

## 2024-09-15 PROCEDURE — C2617 STENT, NON-COR, TEM W/O DEL: HCPCS | Performed by: STUDENT IN AN ORGANIZED HEALTH CARE EDUCATION/TRAINING PROGRAM

## 2024-09-15 PROCEDURE — 85014 HEMATOCRIT: CPT | Performed by: HOSPITALIST

## 2024-09-15 PROCEDURE — 2500000004 HC RX 250 GENERAL PHARMACY W/ HCPCS (ALT 636 FOR OP/ED): Performed by: HOSPITALIST

## 2024-09-15 PROCEDURE — 96361 HYDRATE IV INFUSION ADD-ON: CPT

## 2024-09-15 PROCEDURE — 3700000002 HC GENERAL ANESTHESIA TIME - EACH INCREMENTAL 1 MINUTE: Performed by: STUDENT IN AN ORGANIZED HEALTH CARE EDUCATION/TRAINING PROGRAM

## 2024-09-15 PROCEDURE — 2550000001 HC RX 255 CONTRASTS: Performed by: STUDENT IN AN ORGANIZED HEALTH CARE EDUCATION/TRAINING PROGRAM

## 2024-09-15 PROCEDURE — 71045 X-RAY EXAM CHEST 1 VIEW: CPT | Performed by: RADIOLOGY

## 2024-09-15 PROCEDURE — 2500000001 HC RX 250 WO HCPCS SELF ADMINISTERED DRUGS (ALT 637 FOR MEDICARE OP): Performed by: HOSPITALIST

## 2024-09-15 PROCEDURE — BT1B1ZZ FLUOROSCOPY OF BLADDER AND URETHRA USING LOW OSMOLAR CONTRAST: ICD-10-PCS | Performed by: STUDENT IN AN ORGANIZED HEALTH CARE EDUCATION/TRAINING PROGRAM

## 2024-09-15 PROCEDURE — 0TC18ZZ EXTIRPATION OF MATTER FROM LEFT KIDNEY, VIA NATURAL OR ARTIFICIAL OPENING ENDOSCOPIC: ICD-10-PCS | Performed by: STUDENT IN AN ORGANIZED HEALTH CARE EDUCATION/TRAINING PROGRAM

## 2024-09-15 PROCEDURE — 71045 X-RAY EXAM CHEST 1 VIEW: CPT

## 2024-09-15 PROCEDURE — 0T778DZ DILATION OF LEFT URETER WITH INTRALUMINAL DEVICE, VIA NATURAL OR ARTIFICIAL OPENING ENDOSCOPIC: ICD-10-PCS | Performed by: STUDENT IN AN ORGANIZED HEALTH CARE EDUCATION/TRAINING PROGRAM

## 2024-09-15 PROCEDURE — 80048 BASIC METABOLIC PNL TOTAL CA: CPT | Performed by: HOSPITALIST

## 2024-09-15 PROCEDURE — 2780000003 HC OR 278 NO HCPCS: Performed by: STUDENT IN AN ORGANIZED HEALTH CARE EDUCATION/TRAINING PROGRAM

## 2024-09-15 PROCEDURE — 0TF78ZZ FRAGMENTATION IN LEFT URETER, VIA NATURAL OR ARTIFICIAL OPENING ENDOSCOPIC: ICD-10-PCS | Performed by: STUDENT IN AN ORGANIZED HEALTH CARE EDUCATION/TRAINING PROGRAM

## 2024-09-15 PROCEDURE — 7100000002 HC RECOVERY ROOM TIME - EACH INCREMENTAL 1 MINUTE: Performed by: STUDENT IN AN ORGANIZED HEALTH CARE EDUCATION/TRAINING PROGRAM

## 2024-09-15 PROCEDURE — 36415 COLL VENOUS BLD VENIPUNCTURE: CPT | Performed by: HOSPITALIST

## 2024-09-15 PROCEDURE — 2720000007 HC OR 272 NO HCPCS: Performed by: STUDENT IN AN ORGANIZED HEALTH CARE EDUCATION/TRAINING PROGRAM

## 2024-09-15 PROCEDURE — 7100000001 HC RECOVERY ROOM TIME - INITIAL BASE CHARGE: Performed by: STUDENT IN AN ORGANIZED HEALTH CARE EDUCATION/TRAINING PROGRAM

## 2024-09-15 PROCEDURE — 2500000004 HC RX 250 GENERAL PHARMACY W/ HCPCS (ALT 636 FOR OP/ED): Performed by: ANESTHESIOLOGY

## 2024-09-15 PROCEDURE — 96376 TX/PRO/DX INJ SAME DRUG ADON: CPT | Mod: 59

## 2024-09-15 PROCEDURE — 74420 UROGRAPHY RTRGR +-KUB: CPT

## 2024-09-15 PROCEDURE — 52356 CYSTO/URETERO W/LITHOTRIPSY: CPT | Performed by: STUDENT IN AN ORGANIZED HEALTH CARE EDUCATION/TRAINING PROGRAM

## 2024-09-15 PROCEDURE — 74420 UROGRAPHY RTRGR +-KUB: CPT | Performed by: STUDENT IN AN ORGANIZED HEALTH CARE EDUCATION/TRAINING PROGRAM

## 2024-09-15 PROCEDURE — 3600000003 HC OR TIME - INITIAL BASE CHARGE - PROCEDURE LEVEL THREE: Performed by: STUDENT IN AN ORGANIZED HEALTH CARE EDUCATION/TRAINING PROGRAM

## 2024-09-15 PROCEDURE — 85027 COMPLETE CBC AUTOMATED: CPT | Performed by: HOSPITALIST

## 2024-09-15 PROCEDURE — 3700000001 HC GENERAL ANESTHESIA TIME - INITIAL BASE CHARGE: Performed by: STUDENT IN AN ORGANIZED HEALTH CARE EDUCATION/TRAINING PROGRAM

## 2024-09-15 PROCEDURE — 3600000008 HC OR TIME - EACH INCREMENTAL 1 MINUTE - PROCEDURE LEVEL THREE: Performed by: STUDENT IN AN ORGANIZED HEALTH CARE EDUCATION/TRAINING PROGRAM

## 2024-09-15 PROCEDURE — C1769 GUIDE WIRE: HCPCS | Performed by: STUDENT IN AN ORGANIZED HEALTH CARE EDUCATION/TRAINING PROGRAM

## 2024-09-15 PROCEDURE — 83735 ASSAY OF MAGNESIUM: CPT | Performed by: HOSPITALIST

## 2024-09-15 DEVICE — INLAY OPTIMA URETERAL STENT W/O GUIDEWIRE
Type: IMPLANTABLE DEVICE | Site: URETER | Status: FUNCTIONAL
Brand: BARD® INLAY OPTIMA® URETERAL STENT

## 2024-09-15 RX ORDER — FENTANYL CITRATE 50 UG/ML
INJECTION, SOLUTION INTRAMUSCULAR; INTRAVENOUS AS NEEDED
Status: DISCONTINUED | OUTPATIENT
Start: 2024-09-15 | End: 2024-09-15

## 2024-09-15 RX ORDER — DIPHENHYDRAMINE HYDROCHLORIDE 50 MG/ML
12.5 INJECTION INTRAMUSCULAR; INTRAVENOUS ONCE AS NEEDED
Status: DISCONTINUED | OUTPATIENT
Start: 2024-09-15 | End: 2024-09-15 | Stop reason: HOSPADM

## 2024-09-15 RX ORDER — LABETALOL HYDROCHLORIDE 5 MG/ML
5 INJECTION, SOLUTION INTRAVENOUS ONCE AS NEEDED
Status: DISCONTINUED | OUTPATIENT
Start: 2024-09-15 | End: 2024-09-15 | Stop reason: HOSPADM

## 2024-09-15 RX ORDER — LIDOCAINE HYDROCHLORIDE 10 MG/ML
0.1 INJECTION, SOLUTION INFILTRATION; PERINEURAL ONCE
Status: DISCONTINUED | OUTPATIENT
Start: 2024-09-15 | End: 2024-09-15 | Stop reason: HOSPADM

## 2024-09-15 RX ORDER — PROPOFOL 10 MG/ML
INJECTION, EMULSION INTRAVENOUS AS NEEDED
Status: DISCONTINUED | OUTPATIENT
Start: 2024-09-15 | End: 2024-09-15

## 2024-09-15 RX ORDER — DROPERIDOL 2.5 MG/ML
0.62 INJECTION, SOLUTION INTRAMUSCULAR; INTRAVENOUS ONCE AS NEEDED
Status: DISCONTINUED | OUTPATIENT
Start: 2024-09-15 | End: 2024-09-15 | Stop reason: HOSPADM

## 2024-09-15 RX ORDER — LIDOCAINE HYDROCHLORIDE 20 MG/ML
INJECTION, SOLUTION INFILTRATION; PERINEURAL AS NEEDED
Status: DISCONTINUED | OUTPATIENT
Start: 2024-09-15 | End: 2024-09-15

## 2024-09-15 RX ORDER — FENTANYL CITRATE 50 UG/ML
50 INJECTION, SOLUTION INTRAMUSCULAR; INTRAVENOUS EVERY 5 MIN PRN
Status: DISCONTINUED | OUTPATIENT
Start: 2024-09-15 | End: 2024-09-15 | Stop reason: HOSPADM

## 2024-09-15 RX ORDER — ONDANSETRON HYDROCHLORIDE 2 MG/ML
INJECTION, SOLUTION INTRAVENOUS AS NEEDED
Status: DISCONTINUED | OUTPATIENT
Start: 2024-09-15 | End: 2024-09-15

## 2024-09-15 RX ORDER — PHENYLEPHRINE HCL IN 0.9% NACL 0.4MG/10ML
SYRINGE (ML) INTRAVENOUS AS NEEDED
Status: DISCONTINUED | OUTPATIENT
Start: 2024-09-15 | End: 2024-09-15

## 2024-09-15 RX ORDER — SUCCINYLCHOLINE CHLORIDE 100 MG/5ML
SYRINGE (ML) INTRAVENOUS AS NEEDED
Status: DISCONTINUED | OUTPATIENT
Start: 2024-09-15 | End: 2024-09-15

## 2024-09-15 RX ORDER — CEPHALEXIN 500 MG/1
500 CAPSULE ORAL 2 TIMES DAILY
Qty: 10 CAPSULE | Refills: 0 | Status: SHIPPED | OUTPATIENT
Start: 2024-09-15 | End: 2024-09-20

## 2024-09-15 RX ORDER — ROCURONIUM BROMIDE 10 MG/ML
INJECTION, SOLUTION INTRAVENOUS AS NEEDED
Status: DISCONTINUED | OUTPATIENT
Start: 2024-09-15 | End: 2024-09-15

## 2024-09-15 RX ADMIN — LIDOCAINE HYDROCHLORIDE 50 MG: 20 INJECTION, SOLUTION INFILTRATION; PERINEURAL at 08:50

## 2024-09-15 RX ADMIN — Medication 140 MG: at 08:50

## 2024-09-15 RX ADMIN — PROPOFOL 200 MG: 10 INJECTION, EMULSION INTRAVENOUS at 08:50

## 2024-09-15 RX ADMIN — ROCURONIUM BROMIDE 30 MG: 10 SOLUTION INTRAVENOUS at 08:54

## 2024-09-15 RX ADMIN — DEXAMETHASONE SODIUM PHOSPHATE 8 MG: 4 INJECTION, SOLUTION INTRAMUSCULAR; INTRAVENOUS at 08:54

## 2024-09-15 RX ADMIN — Medication 100 MCG: at 09:06

## 2024-09-15 RX ADMIN — FENTANYL CITRATE 50 MCG: 50 INJECTION, SOLUTION INTRAMUSCULAR; INTRAVENOUS at 08:50

## 2024-09-15 RX ADMIN — ONDANSETRON 4 MG: 2 INJECTION, SOLUTION INTRAMUSCULAR; INTRAVENOUS at 09:23

## 2024-09-15 RX ADMIN — SUGAMMADEX 200 MG: 100 INJECTION, SOLUTION INTRAVENOUS at 09:26

## 2024-09-15 SDOH — HEALTH STABILITY: MENTAL HEALTH: CURRENT SMOKER: 0

## 2024-09-15 ASSESSMENT — PAIN SCALES - GENERAL
PAINLEVEL_OUTOF10: 0 - NO PAIN
PAINLEVEL_OUTOF10: 0 - NO PAIN
PAINLEVEL_OUTOF10: 6
PAIN_LEVEL: 0
PAINLEVEL_OUTOF10: 0 - NO PAIN

## 2024-09-15 ASSESSMENT — PAIN - FUNCTIONAL ASSESSMENT
PAIN_FUNCTIONAL_ASSESSMENT: 0-10

## 2024-09-15 ASSESSMENT — PAIN DESCRIPTION - ORIENTATION: ORIENTATION: LEFT

## 2024-09-15 NOTE — ANESTHESIA POSTPROCEDURE EVALUATION
Patient: Kristy Correa    Procedure Summary       Date: 09/15/24 Room / Location: [facility] / [facility]    Anesthesia Start: 0844 Anesthesia Stop: 0940    Procedure: Cystoscopy with Lithotripsy Laser,Left Ureteroscopy, Laser Lithotripsy, Stent Placement (Left: Groin) Diagnosis: Ureteral stone    Surgeons: Mahesh Valentin MD Responsible Provider: Justin Thompson MD    Anesthesia Type: general ASA Status: 4            Anesthesia Type: general    Vitals Value Taken Time   /62 09/15/24 0940   Temp 36.0 09/15/24 0940   Pulse 64 09/15/24 0940   Resp 18 09/15/24 0940   SpO2 100% 09/15/24 0940       Anesthesia Post Evaluation    Patient location during evaluation: bedside  Patient participation: complete - patient participated  Level of consciousness: awake and alert  Pain score: 0  Pain management: adequate  Multimodal analgesia pain management approach  Airway patency: patent  Cardiovascular status: acceptable  Respiratory status: acceptable and face mask  Hydration status: acceptable  Postoperative Nausea and Vomiting: none        There were no known notable events for this encounter.

## 2024-09-15 NOTE — ANESTHESIA PREPROCEDURE EVALUATION
Kristy Correa is a 63 y.o. female here for:    Cystoscopy with Lithotripsy Laser  With Mahesh Valentin MD  * No Diagnosis Codes entered *    Relevant Problems   Cardiac   (+) Atrial fibrillation (Multi)      GI   (+) Gastroesophageal reflux disease      /Renal   (+) Acute kidney injury (nontraumatic) (CMS-HCC)   (+) Kidney stones      Endocrine   (+) Class 3 severe obesity with body mass index (BMI) of 60.0 to 69.9 in adult (Multi)      Hematology   (+) Anemia      Musculoskeletal   (+) Rheumatoid arthritis (Multi)       Lab Results   Component Value Date    HGB 8.7 (L) 09/15/2024    HCT 30.1 (L) 09/15/2024    WBC 7.0 09/15/2024     09/15/2024     09/15/2024    K 3.8 09/15/2024     09/15/2024    CREATININE 1.80 (H) 09/15/2024    BUN 24 (H) 09/15/2024       Social History     Tobacco Use   Smoking Status Never   • Passive exposure: Never   Smokeless Tobacco Never       Allergies   Allergen Reactions   • Hydrocortisone Rash     Patient had Cortizone injection in the left knee, rash was arms, legs, torso.  Itch, warm.   • Fentanyl Other   • Fexofenadine Unknown     vertigo   • Hydromorphone Other   • Methylprednisolone Unknown     Allergy to prednisone in the medrol dose pack   • Tetanus Vaccines And Toxoid Swelling and Unknown   • Azithromycin Rash and Unknown     Zpak       Current Outpatient Medications   Medication Instructions   • alendronate (FOSAMAX) 70 mg, oral, Every 7 days, Take in the morning with a full glass of water, on an empty stomach, and do not take anything else by mouth or lie down for the next 30 min.   • B complex-vitamin C-folic acid (Nephro-La Rx) 1- mg-mg-mcg tablet 1 tablet, oral, Daily with breakfast   • B complex-vitamin C-folic acid (Nephro-La Rx) 1- mg-mg-mcg tablet 1 tablet, oral, Daily with breakfast   • cholecalciferol (VITAMIN D-3) 5,000 Units, oral, Daily   • furosemide (LASIX) 20 mg, oral, Daily   • pantoprazole (PROTONIX) 40 mg, oral, 2 times  daily, Do not crush, chew, or split.   • predniSONE (DELTASONE) 10 mg, oral, Daily       Past Surgical History:   Procedure Laterality Date   • CYSTOSCOPY W/ LASER LITHOTRIPSY Left 09/02/2024   • OTHER SURGICAL HISTORY  06/17/2022    Cholecystectomy   • OTHER SURGICAL HISTORY  06/17/2022    Colposcopy   • OTHER SURGICAL HISTORY  06/17/2022    Tubal ligation bilateral   • OTHER SURGICAL HISTORY  06/17/2022    Complete colonoscopy   • OTHER SURGICAL HISTORY  09/27/2022    Bariatric surgery   • OTHER SURGICAL HISTORY  09/27/2022    Esophagogastroduodenoscopy       No family history on file.    NPO Details:  No data recorded    Physical Exam    Airway  Mallampati: II  TM distance: >3 FB  Neck ROM: full     Cardiovascular    Dental - normal exam     Pulmonary    Abdominal        Anesthesia Plan    History of general anesthesia?: yes  History of complications of general anesthesia?: no    ASA 4     general     The patient is not a current smoker.    intravenous induction   Anesthetic plan and risks discussed with patient.

## 2024-09-15 NOTE — ANESTHESIA PROCEDURE NOTES
Airway  Date/Time: 9/15/2024 8:51 AM  Urgency: elective    Airway not difficult    Staffing  Performed: attending   Authorized by: Justin Thompson MD    Performed by: Justin Thompson MD  Patient location during procedure: OR    Indications and Patient Condition  Indications for airway management: anesthesia and airway protection  Spontaneous Ventilation: absent  Sedation level: deep  Preoxygenated: yes  Patient position: sniffing  MILS maintained throughout  Mask difficulty assessment: 1 - vent by mask  Planned trial extubation    Final Airway Details  Final airway type: endotracheal airway      Successful airway: ETT  Cuffed: yes   Successful intubation technique: direct laryngoscopy  Facilitating devices/methods: intubating stylet  Endotracheal tube insertion site: oral  Blade: Bloom  Blade size: #2  ETT size (mm): 7.0  Cormack-Lehane Classification: grade I - full view of glottis  Placement verified by: chest auscultation, capnometry and palpation of cuff   Measured from: lips  ETT to lips (cm): 22  Number of attempts at approach: 1  Number of other approaches attempted: 0

## 2024-09-15 NOTE — OP NOTE
Cystoscopy with Lithotripsy Laser,Left Ureteroscopy, Laser Lithotripsy, Stent Placement (L) Operative Note     Date: 2024 - 9/15/2024  OR Location: [facility]    Name: Kristy Correa, : 1960, Age: 63 y.o., MRN: [id number], Sex: female    Diagnosis  Pre-op Diagnosis      * Ureteral stone [N20.1] Post-op Diagnosis     * Ureteral stone [N20.1]     Procedures  Cystoscopy with Lithotripsy Laser,Left Ureteroscopy, Laser Lithotripsy, Stent Placement  44023 - [place] CYSTO/URETERO W/LITHOTRIPSY &INDWELL STENT INSRT      Surgeons      * Mahesh Valentin - Primary    Resident/Fellow/Other Assistant:  Surgeons and Role:  * No surgeons found with a matching role *    Procedure Summary  Anesthesia: General  ASA: IV  Anesthesia Staff: Anesthesiologist: Justin Thompson MD  Estimated Blood Loss: 1mL  Intra-op Medications:   Administrations occurring from 0900 to 1030 on 09/15/24:   Medication Name Total Dose   furosemide (Lasix) tablet 20 mg Cannot be calculated   pantoprazole (ProtoNix) EC tablet 40 mg Cannot be calculated   predniSONE (Deltasone) tablet 10 mg Cannot be calculated   sodium chloride 0.9% infusion Cannot be calculated              Anesthesia Record               Intraprocedure I/O Totals          Intake    sodium chloride 0.9% infusion 200.00 mL    Total Intake 200 mL          Specimen: No specimens collected     Staff:   Circulator: Kavitha Gurrola Person: Travis         Drains and/or Catheters: * None in log *    Tourniquet Times:         Implants:  Implants       Type Name Action Serial No.      Stent STENT, INLAY OPTIMA, 6FR X 24CM - [id number] Implanted               Findings: Retrograde pyelogram showed no hydronephrosis or obvious filling defect. Contrast passed inconsistently in the proximal ureter consistent with possible stone there. Ultimately during ureteroscopy no ureteral stone was identified but there was significant stone dust in the bladder and ureter. 4 small 2-5mm stones were identified throughout  the calices and were dusted.     Indications: Kristy Correa is an 63 y.o. female who is having surgery for left ureter stone and renal colic. She elected to have stone treatment as her UA was negative for infection, and she received IV rocephin on the floor for >24hrs.     The patient was seen in the preoperative area. The risks, benefits, complications, treatment options, non-operative alternatives, expected recovery and outcomes were discussed with the patient. The possibilities of reaction to medication, pulmonary aspiration, injury to surrounding structures, bleeding, recurrent infection, the need for additional procedures, failure to diagnose a condition, and creating a complication requiring transfusion or operation were discussed with the patient. The patient concurred with the proposed plan, giving informed consent.  The site of surgery was properly noted/marked if necessary per policy. The patient has been actively warmed in preoperative area. Preoperative antibiotics have been ordered and given within 2 hours of incision. Venous thrombosis prophylaxis have been ordered including bilateral sequential compression devices    Procedure Details: Patient consented to procedure in preoperative area. Risks and benefits discussed. Patient was marked on the left side. Allergies were reviewed and preoperative antibiotics were administered. Patient was brought to the operating room and placed in supine position on the operating room table. A timeout was performed. All were in agreement. Patient underwent general anesthesia without complication. They were repositioned in dorsal lithotomy and prepped and draped in the usual sterile fashion. A 21 fr rigid cystoscope was used to perform cystourethroscopy. Urethra was normal. UOs were in normal orthotopic position. No masses were identified. There was significant yellow stone debris throughout the blader. Through the left UO, a sensor wire was placed through a 5frcatheter  to the level of the kidney as confirmed on fluoroscopy. The catheter was then removed. Retrograde pyelogram was performed.     Retrograde pyelogram interpretation: Ureter had normal caliber and course. Filling defect possibly noted in the proximal left ureter.     A second wire was advanced to the kidney through the catheter. One wire was secured as a safety wire. The flexible ureteroscope was advanced over the second wire under fluoro. Pan pyeloscopy was performed. Nephrolithiasis was noted in the each calyx. A 200 micron holmium laser fiber was used to fragment all stones. Pyeloscopy was repeated to confirm no large stone fragments remained. Ureteral access sheath was withdrawn under direct visualization. No ureteral stones were noted on ureteroscopy. A 6x 24cm JJ stent was placed over the safety wire with proximal curl noted in kidney on fluoro and distal curl in the bladder on direct visualization. Bladder was drained, instruments removed. This concluded the procedure. Patient was awoken from anesthesia without complication and transferred to PACU in stable condition.     Complications:  None; patient tolerated the procedure well.    Disposition: PACU - hemodynamically stable.  Condition: stable         Additional Details: She will RTC in 2 weeks for cysto + stent removal    Attending Attestation: I performed the procedure.    Mahesh Valentin  Phone Number: [phone]

## 2024-09-15 NOTE — NURSING NOTE
"Pt had some roast turkey and mashed potatoes for lunch. After only a few bites she developed an upset stomach. Refused Zofran and just wanted some time to let her stomach \"settle\".  "

## 2024-09-17 DIAGNOSIS — Z00.6 RESEARCH STUDY PATIENT: ICD-10-CM

## 2024-09-18 ENCOUNTER — TELEPHONE (OUTPATIENT)
Dept: INTERNAL MEDICINE | Facility: HOSPITAL | Age: 64
End: 2024-09-18
Payer: COMMERCIAL

## 2024-09-18 DIAGNOSIS — N20.0 KIDNEY STONES: Primary | ICD-10-CM

## 2024-09-19 DIAGNOSIS — Z00.6 RESEARCH STUDY PATIENT: ICD-10-CM

## 2024-09-20 ENCOUNTER — LAB (OUTPATIENT)
Dept: LAB | Facility: LAB | Age: 64
End: 2024-09-20
Payer: COMMERCIAL

## 2024-09-20 DIAGNOSIS — N20.0 KIDNEY STONES: ICD-10-CM

## 2024-09-20 LAB
ANION GAP SERPL CALC-SCNC: 16 MMOL/L (ref 10–20)
BUN SERPL-MCNC: 23 MG/DL (ref 6–23)
CALCIUM SERPL-MCNC: 9.5 MG/DL (ref 8.6–10.3)
CHLORIDE SERPL-SCNC: 104 MMOL/L (ref 98–107)
CO2 SERPL-SCNC: 29 MMOL/L (ref 21–32)
CREAT SERPL-MCNC: 1.1 MG/DL (ref 0.5–1.05)
EGFRCR SERPLBLD CKD-EPI 2021: 57 ML/MIN/1.73M*2
GLUCOSE SERPL-MCNC: 118 MG/DL (ref 74–99)
POTASSIUM SERPL-SCNC: 4.8 MMOL/L (ref 3.5–5.3)
SODIUM SERPL-SCNC: 144 MMOL/L (ref 136–145)

## 2024-09-20 PROCEDURE — 80048 BASIC METABOLIC PNL TOTAL CA: CPT

## 2024-09-20 NOTE — DISCHARGE SUMMARY
DISCHARGE DIAGNOSIS         HOSPITAL COURSE AND DETAILS     Kristy Correa is a 63 y.o. female with a significant past medical history of rheumatoid arthritis, osteoarthritis, gastroesophageal reflux disease who presented to the emergency room with abdominal pain and chest pain.     She had a recent hospitalization here for kidney stone, she underwent a stent placement with laser lithotripsy and had her stent removed last week.  Subsequently after that she stated that she started having chest pain and abdominal pain.     CT abdomen showed old 5 mm stone and a new 4 mm stone in left ureter with hydronephrosis.  This was discussed with urology.     CT angio of chest showed no evidence of PE    She was seen by urology, underwent cystoscopy which showed no ureteral stone identified but there was a significant stone dust in the bladder.  She overall felt better, was discharged on Keflex, outpatient follow-up with urology.    * Some of these notes were completed using Dragon voice recognition technology and may include unintended areas with respect to translation of word, typographical errors or primary areas which may not have been identified prior to finalization of the document.      DISCHARGE PHYSICAL EXAM     Last Recorded Vitals:  Vitals:    09/15/24 1043 09/15/24 1218 09/15/24 1500 09/15/24 1518   BP: 110/56 128/59  122/60   BP Location:       Patient Position:       Pulse: 59 65  72   Resp:       Temp: 36.2 °C (97.2 °F)   36.6 °C (97.9 °F)   TempSrc:       SpO2: 97% 96%  95%   Weight:   (!) 167 kg (369 lb 0.8 oz)    Height:           Physical Exam  PHYSICAL EXAM:   GENERAL: Laying in bed, does not appear to be in any distress.   HEENT: HEAD: Normocephalic atraumatic.  Neck: Supple.  Eyes: Pupils are reactive to direct light.   CVS: S1, S2 heard. Regular rate and rhythm  LUNGS: Clear to auscultate bilaterally. No wheezing or rhonchi appreciated.  ABDOMEN: Soft, nontender to palpate. Positive bowel sounds. No  guarding or rebound appreciated.  NEUROLOGICAL: No focal neurological deficits appreciated. Cranial nerves are grossly intact.  EXTREMITIES: Dorsalis pedis pulses can be appreciated. No edema appreciated.  SKIN:  Grossly intact, warm and dry.    DISCHARGE MEDICATIONS        Your medication list        CONTINUE taking these medications        Instructions Last Dose Given Next Dose Due   alendronate 70 mg tablet  Commonly known as: Fosamax           B complex-vitamin C-folic acid 1- mg-mg-mcg tablet  Commonly known as: Nephro-La Rx           B complex-vitamin C-folic acid 1- mg-mg-mcg tablet  Commonly known as: Nephro-La Rx           cephalexin 500 mg capsule  Commonly known as: Keflex      Take 1 capsule (500 mg) by mouth 2 times a day for 5 days.       cholecalciferol 5,000 Units tablet  Commonly known as: Vitamin D-3           furosemide 20 mg tablet  Commonly known as: Lasix           pantoprazole 20 mg EC tablet  Commonly known as: ProtoNix           predniSONE 10 mg tablet  Commonly known as: Deltasone                     Where to Get Your Medications        These medications were sent to [id number] [place], [place] - [address] [facility]  [address] [id number]      Phone: [phone]   cephalexin 500 mg capsule           OUTPATIENT FOLLOW-UP     Future Appointments   Date Time Provider Department Center   9/30/2024 11:00 AM Mahesh Valentin MD [facility]   10/3/2024  8:00 AM [facility] 1 [facility]   10/3/2024  8:45 AM [facility] 2 [facility]   10/3/2024  9:45 AM [facility]   10/3/2024 10:45 AM [facility]   10/3/2024 11:45 AM [facility]   10/3/2024 12:45 PM [facility]   10/3/2024  1:15 PM Gela Montoya OT [id number] [facility]

## 2024-09-21 ENCOUNTER — TELEPHONE (OUTPATIENT)
Dept: INTERNAL MEDICINE | Facility: HOSPITAL | Age: 64
End: 2024-09-21
Payer: COMMERCIAL

## 2024-09-27 ENCOUNTER — HOSPITAL ENCOUNTER (OUTPATIENT)
Dept: RESPIRATORY THERAPY | Facility: HOSPITAL | Age: 64
Discharge: HOME | End: 2024-09-27
Payer: COMMERCIAL

## 2024-09-27 DIAGNOSIS — Z00.6 RESEARCH STUDY PATIENT: ICD-10-CM

## 2024-09-30 ENCOUNTER — PROCEDURE VISIT (OUTPATIENT)
Dept: UROLOGY | Facility: CLINIC | Age: 64
End: 2024-09-30
Payer: COMMERCIAL

## 2024-09-30 VITALS — HEART RATE: 90 BPM | SYSTOLIC BLOOD PRESSURE: 151 MMHG | DIASTOLIC BLOOD PRESSURE: 79 MMHG

## 2024-09-30 DIAGNOSIS — N20.0 KIDNEY STONES: ICD-10-CM

## 2024-09-30 DIAGNOSIS — Z46.6 ENCOUNTER FOR REMOVAL OF URETERAL STENT: ICD-10-CM

## 2024-09-30 LAB
POC APPEARANCE, URINE: CLEAR
POC BILIRUBIN, URINE: NEGATIVE
POC BLOOD, URINE: ABNORMAL
POC COLOR, URINE: YELLOW
POC GLUCOSE, URINE: NEGATIVE MG/DL
POC KETONES, URINE: NEGATIVE MG/DL
POC LEUKOCYTES, URINE: ABNORMAL
POC NITRITE,URINE: NEGATIVE
POC PH, URINE: 5.5 PH
POC PROTEIN, URINE: ABNORMAL MG/DL
POC SPECIFIC GRAVITY, URINE: 1.02
POC UROBILINOGEN, URINE: 0.2 EU/DL

## 2024-09-30 PROCEDURE — 52310 CYSTOSCOPY AND TREATMENT: CPT | Performed by: STUDENT IN AN ORGANIZED HEALTH CARE EDUCATION/TRAINING PROGRAM

## 2024-09-30 PROCEDURE — 99214 OFFICE O/P EST MOD 30 MIN: CPT | Performed by: STUDENT IN AN ORGANIZED HEALTH CARE EDUCATION/TRAINING PROGRAM

## 2024-09-30 PROCEDURE — 81003 URINALYSIS AUTO W/O SCOPE: CPT | Performed by: STUDENT IN AN ORGANIZED HEALTH CARE EDUCATION/TRAINING PROGRAM

## 2024-09-30 RX ORDER — POTASSIUM CITRATE 10 MEQ/1
10 TABLET, EXTENDED RELEASE ORAL
Qty: 90 TABLET | Refills: 11 | Status: SHIPPED | OUTPATIENT
Start: 2024-09-30

## 2024-09-30 NOTE — PROGRESS NOTES
Scribed for Dr. Mahesh Valentin by Dagoberto Powell. I, Dr. Mahesh Valentin have personally reviewed and agreed with the information entered by the Virtual Scribe. 09/30/24.    ASSESSMENT:  Problem List Items Addressed This Visit       Kidney stones    Relevant Medications    potassium citrate CR (Urocit-K-10) 10 mEq ER tablet    Other Relevant Orders    POCT UA (nonautomated) manually resulted    POCT UA Automated manually resulted (Completed)    US renal complete     Other Visit Diagnoses       Encounter for removal of ureteral stent        Relevant Medications    potassium citrate CR (Urocit-K-10) 10 mEq ER tablet    Other Relevant Orders    Cystourethroscopy    POCT UA Automated manually resulted (Completed)    US renal complete           PLAN:  #Nephrolithiasis  Hx of obstructing left ureteral stones.   S/p left ULLS + stone extraction with Dr. Sherman. (09/02/24).  S/p left URS + stent with me. (09/15/24)  See HPI below.     Reports she has been doing well post-operatively.   Left ureteral stent removed in-office. (09/30/24)  Elects to start potassium citrate for prevention and continue stone surveillance.   Urine pH 5.5, re-check next visit.   RTC in 2-3 months with a RBUS prior to.     Discussion:  Patient was educated on stone prevention dietary modifications which include increased water intake, citric acid supplementation in the form of lemon juice, low oxalate diet, moderate protein intake and low sodium intake. In addition, suggested avoiding dark-colored sodas. A daily urine output of 2.5 L is also recommended if feasible.     All questions were answered to the patient’s satisfaction.  Patient agrees with the plan and wishes to proceed.  Continue follow-up for ongoing care of her chronic medical conditions.       History of Present Illness (HPI):  Kristy presents for cystoscopy + stent removal.  The patient’s EMR has been reviewed.  Lives in [place], [place].     Hx of nephrolithiasis.  Recently treated for an  obstructing L ureteral stone + renal colic.   Underwent left ULLS + stone extraction with Dr. Sherman. (09/02/24).   Stent removed with Dr. Sherman. (09/10/24)   Returned to the ED for acute CP. (09/13/24)  CT negative for concerning cardiopulmonary pathology.   Albeit, showed an old 5 mm + new 4 mm left ureteral stone.   Urology consulted, seen by me while inpatient.   Underwent left URS + stent with me. (09/15/24)  Showed no ureteral stone; see below.  Noted significant stone dust in the bladder.   She felt better overall following URS+stent.   Discharged on Keflex, presents for outpatient follow up.     OR Findings (09/15/24): Retrograde pyelogram showed no hydronephrosis or obvious filling defect. Contrast passed inconsistently in the proximal ureter consistent with possible stone there. Ultimately during ureteroscopy no ureteral stone was identified but there was significant stone dust in the bladder and ureter. 4 small 2-5mm stones were identified throughout the calices and were dusted.     TODAY: (09/30/24)  Presents for cystoscopy + stent removal.   Reports she has been doing well overall.   Has minimal stent-related discomfort, not bothersome.   Denies any recent infections, fevers or chills.     Cystoscopy performed:   Kristy Correa identified using two (2) forms of identification.  Procedure: diagnostic cystourethroscopy  Indications for procedure: stent removal.   Risks, benefits, and alternatives were discussed in detail.   Patient appears to understand and agrees to proceed.   Patient has signed the procedure consent form.     Cystoscopy findings:  Urethra: normal course and caliber, no evidence of stricture or lesion.  Bladder: normal capacity, no trabeculations, no diverticulum, no stone, tumors or other lesions.  Ureteral stent visualized, grasped and removed in its entirety utilizing sterile technique.   Post-cystoscopy: Patient tolerated procedure without complications.    PMH: pAFIB,  Rheumatoid arthritis, osteoarthritis, GERD, nephrolithiasis.   PSH: ULLS, cholecystectomy, colposcopy, tubal ligation, bariatric surgery.   FH: Denies FH of  malignancy.   SH: Non-smoker.     Past Medical History:   Diagnosis Date    Arthritis     COVID-19 06/17/2022    COVID-19 virus infection    GERD (gastroesophageal reflux disease)     Paroxysmal atrial fibrillation (Multi)     Paroxysmal atrial fibrillation    RA (rheumatoid arthritis) (Multi)      Past Surgical History:   Procedure Laterality Date    CYSTOSCOPY W/ LASER LITHOTRIPSY Left 09/02/2024    OTHER SURGICAL HISTORY  06/17/2022    Cholecystectomy    OTHER SURGICAL HISTORY  06/17/2022    Colposcopy    OTHER SURGICAL HISTORY  06/17/2022    Tubal ligation bilateral    OTHER SURGICAL HISTORY  06/17/2022    Complete colonoscopy    OTHER SURGICAL HISTORY  09/27/2022    Bariatric surgery    OTHER SURGICAL HISTORY  09/27/2022    Esophagogastroduodenoscopy     No family history on file.  Social History     Tobacco Use   Smoking Status Never    Passive exposure: Never   Smokeless Tobacco Never     Current Outpatient Medications   Medication Sig Dispense Refill    alendronate (Fosamax) 70 mg tablet Take 1 tablet (70 mg) by mouth every 7 days. Take in the morning with a full glass of water, on an empty stomach, and do not take anything else by mouth or lie down for the next 30 min.      B complex-vitamin C-folic acid (Nephro-La Rx) 1- mg-mg-mcg tablet Take 1 tablet by mouth once daily with breakfast.      B complex-vitamin C-folic acid (Nephro-La Rx) 1- mg-mg-mcg tablet Take 1 tablet by mouth once daily with breakfast.      cholecalciferol (Vitamin D-3) 5,000 Units tablet Take 1 tablet (5,000 Units) by mouth once daily.      furosemide (Lasix) 20 mg tablet Take 1 tablet (20 mg) by mouth once daily.      pantoprazole (ProtoNix) 20 mg EC tablet Take 2 tablets (40 mg) by mouth 2 times a day. Do not crush, chew, or split.      predniSONE (Deltasone)  10 mg tablet Take 1 tablet (10 mg) by mouth once daily.       No current facility-administered medications for this visit.     Allergies   Allergen Reactions    Hydrocortisone Rash     Patient had Cortizone injection in the left knee, rash was arms, legs, torso.  Itch, warm.    Fentanyl Other    Fexofenadine Unknown     vertigo    Hydromorphone Other    Methylprednisolone Unknown     Allergy to prednisone in the medrol dose pack    Tetanus Vaccines And Toxoid Swelling and Unknown    Azithromycin Rash and Unknown     Zpak     Past medical, surgical, family and social history in the chart was reviewed and is accurate including any additions to what is in this HPI.    REVIEW OF SYSTEMS (ROS):   Constitutional: denies any unintentional weight loss or change in strength.  Integumentary: denies any rashes or pruritus.  Eyes: denies any double vision or eye pain.  Ear/Nose/Mouth/Throat: denies any nosebleeds or gum bleeds.  Cardiovascular: denies any chest pain or syncope.  Respiratory: denies hemoptysis.  Gastrointestinal: denies nausea or vomiting.  Musculoskeletal: denies muscle cramping or weakness.  Neurologic: denies convulsions or seizures.  Hematologic/Lymphatic: denies bleeding tendencies.  Endocrine: denies heat/cold intolerance.  All other systems have been reviewed and are negative unless otherwise noted in the HPI.     OBJECTIVE:  Visit Vitals  /79   Pulse 90     PHYSICAL EXAM:  Constitutional: No obvious distress.  Eyes: Non-injected conjunctiva, sclera clear, EOMI.  Ears/Nose/Mouth/Throat: No obvious drainage per ears or nose.  Cardiovascular: Extremities are warm and well perfused. No edema, cyanosis or pallor.  Respiratory: No audible wheezing/stridor; respirations do not appear labored.  Gastrointestinal: Abdomen soft, not distended.  Musculoskeletal: Normal ROM of extremities.  Skin: No obvious rashes or open sores.  Neurologic: Alert and oriented, CN 2-12 grossly intact.  Psychiatric: Answers  questions appropriately with normal affect.  Hematologic/Lymphatic/Immunologic: No obvious bruises or sites of spontaneous bleeding.  Genitourinary: No CVA tenderness, bladder not palpable.     LABS & IMAGING:  Lab Results   Component Value Date    WBC 7.0 09/15/2024    HGB 9.1 (L) 09/15/2024    HCT 31.5 (L) 09/15/2024     09/15/2024    CHOL 231 (H) 03/01/2024    TRIG 76 03/01/2024    HDL 74.4 03/01/2024    ALT 16 09/13/2024    AST 18 09/13/2024     09/20/2024    K 4.8 09/20/2024     09/20/2024    CREATININE 1.10 (H) 09/20/2024    BUN 23 09/20/2024    CO2 29 09/20/2024    TSH 1.75 03/01/2024    INR 1.1 09/13/2024    HGBA1C 5.3 03/01/2024     Scribed for Dr. Mahesh Valentin by Dagoberto Powell.  I, Dr. Mahesh Valentin have personally reviewed and agreed with the information entered by the Virtual Scribe. 09/30/24.

## 2024-10-03 ENCOUNTER — HOSPITAL ENCOUNTER (OUTPATIENT)
Dept: RADIOLOGY | Facility: HOSPITAL | Age: 64
Discharge: HOME | End: 2024-10-03
Payer: COMMERCIAL

## 2024-10-03 ENCOUNTER — EVALUATION (OUTPATIENT)
Dept: OCCUPATIONAL THERAPY | Facility: HOSPITAL | Age: 64
End: 2024-10-03
Payer: COMMERCIAL

## 2024-10-03 DIAGNOSIS — Z00.6 RESEARCH STUDY PATIENT: Primary | ICD-10-CM

## 2024-10-03 DIAGNOSIS — Z00.6 ENCOUNTER FOR EXAMINATION FOR NORMAL COMPARISON AND CONTROL IN CLINICAL RESEARCH PROGRAM: ICD-10-CM

## 2024-10-03 DIAGNOSIS — Z00.6 RESEARCH STUDY PATIENT: ICD-10-CM

## 2024-10-03 PROCEDURE — 71250 CT THORAX DX C-: CPT

## 2024-10-03 PROCEDURE — 78264 GASTRIC EMPTYING IMG STUDY: CPT

## 2024-10-03 PROCEDURE — 97750 PHYSICAL PERFORMANCE TEST: CPT | Mod: GO

## 2024-10-03 PROCEDURE — 3430000001 HC RX 343 DIAGNOSTIC RADIOPHARMACEUTICALS: Mod: SE | Performed by: INTERNAL MEDICINE

## 2024-10-03 NOTE — PROGRESS NOTES
"Reason for Visit:   Patient is participating in a research study as per specific study detail below .   Diagnosis code Z00.6  Award: [id number]  PTAEO: [id number]  IRB #: [id number]     Study short name: RECOVER.   Type of Visit:   occupational therapy.   -----------------------------------   This note is created in accordance with Standard Operating Procedures for Research Studies at [facility] which can be found on the intranet at: [email or web address]          Strength measured in pounds (remove hand and wrist jewelry, subject seated with back, pelvis, and knees as close to 90 deg as possible, participant completes two warm-up exercises shaking both hands three times and bending and stretching all fingers three times). \"For the test, I will ask you to squeeze this hand  as hard as you can while you remain seated. You will hold your hand so that it's not touching your body and squeeze the handle. I want you to sit tall and try not to lean when you squeeze. You will take a breath in, then blow out while you squeeze. You will squeeze as hard as you can until you can't squeeze any harder. Like this. (Do the squeeze demo). We will test each hand 3 times.\"    Right Test 1 - 41  Right Test 2 - 18  Right Test 3 - 34     Left Test 1 - 24  Left Test 2 - 39  Left Test 3 - 36      Method 1 set up: wooden treatment table with knee in appx 90 deg flexion, small wedge bolster placed at posterior aspect of distal thigh to minimize posterior thigh discomfort, and a gait belt is used to stabilize the thighs to the table. Participant keeps arms crossed during testing to isolate the quad muscle. A foam pad is placed across the anterior shin, in a location consistent with the isokinetic dynamometer (appx 5 cm prox to lateral malleolus) with the HHD secured against the leg of the table. A small elastic wrap is positioned " "between the table leg and calf mm to minimize belt slack. Participant performs a warm up consisting of three isometric contractions at 50% effort, 75% effort, and 100% effort with 1 minute rest between each contraction. After the warm-up, participant completes three maximal isometric contractions, holding for approximately 3 seconds, with one minute rest between each contraction. Test dominant limb first.   Method 2 set up: This setup procedure should be followed if an appropriate examination table is not available for Method 1. Participants should be seated upright with the leg vertical and the knee in approximately 90 degrees of flexion to decrease the influence of gravity during testing. Participants are not allowed to lean back in the chair while being tested. The counterforce to the knee extension force is applied by the jackson through the HHD, which is perpendicular and just proximal to the malleoli.     Therapist stabilized  Left quadriceps lever arm length: 39  cm  Left Test 1 - 76.5  Left Test 2 - 74.5  Left Test 3 - 72.9    Therapist stabilized  Right quadriceps lever arm length: 40  cm  Right Test 1 - 69.4  Right Test 2 - 63.6  Right Test 3 - 58.7      Timed Up and Go (TUG) measured in seconds (participant sits in a standard arm chair with their back against the chair and arms resting on the chair's arms. A line is marked 10 ft away). Instruct the patient \"When I say Go, I want you to stand up from the chair, walk to the line on the floor at your normal pace, turn, walk back to the chair at your normal pace, and sit down again.\" The upper extremities should not be on the assistive device but the device should be nearby if needed. Demonstrate the test to the participant. The stopwatch starts when the participant is told to go and stops when his/her buttocks touch the seat.     Practice Trial - 23  Test 1 - 21      "

## 2024-10-11 ENCOUNTER — APPOINTMENT (OUTPATIENT)
Dept: RADIOLOGY | Facility: HOSPITAL | Age: 64
End: 2024-10-11
Payer: COMMERCIAL

## 2024-10-24 ENCOUNTER — APPOINTMENT (OUTPATIENT)
Dept: UROLOGY | Facility: CLINIC | Age: 64
End: 2024-10-24
Payer: COMMERCIAL

## 2024-11-16 ENCOUNTER — HOSPITAL ENCOUNTER (OUTPATIENT)
Dept: RADIOLOGY | Facility: HOSPITAL | Age: 64
Discharge: HOME | End: 2024-11-16
Payer: COMMERCIAL

## 2024-11-16 DIAGNOSIS — N20.0 KIDNEY STONES: ICD-10-CM

## 2024-11-16 DIAGNOSIS — Z46.6 ENCOUNTER FOR REMOVAL OF URETERAL STENT: ICD-10-CM

## 2024-11-16 PROCEDURE — 76770 US EXAM ABDO BACK WALL COMP: CPT

## 2024-11-16 PROCEDURE — 76770 US EXAM ABDO BACK WALL COMP: CPT | Performed by: STUDENT IN AN ORGANIZED HEALTH CARE EDUCATION/TRAINING PROGRAM

## 2024-11-27 DIAGNOSIS — Z00.6 RESEARCH STUDY PATIENT: Primary | ICD-10-CM

## 2024-12-02 ENCOUNTER — TELEMEDICINE (OUTPATIENT)
Dept: UROLOGY | Facility: CLINIC | Age: 64
End: 2024-12-02
Payer: COMMERCIAL

## 2024-12-02 DIAGNOSIS — N20.0 KIDNEY STONES: ICD-10-CM

## 2024-12-02 DIAGNOSIS — Z46.6 ENCOUNTER FOR REMOVAL OF URETERAL STENT: ICD-10-CM

## 2024-12-02 PROCEDURE — 1036F TOBACCO NON-USER: CPT | Performed by: STUDENT IN AN ORGANIZED HEALTH CARE EDUCATION/TRAINING PROGRAM

## 2024-12-02 PROCEDURE — 99214 OFFICE O/P EST MOD 30 MIN: CPT | Performed by: STUDENT IN AN ORGANIZED HEALTH CARE EDUCATION/TRAINING PROGRAM

## 2024-12-02 RX ORDER — POTASSIUM CITRATE 10 MEQ/1
10 TABLET, EXTENDED RELEASE ORAL
Qty: 90 TABLET | Refills: 11 | Status: SHIPPED | OUTPATIENT
Start: 2024-12-02

## 2024-12-02 NOTE — PROGRESS NOTES
Scribed for Dr. Mahesh Valentin by Dagoberto Powell. I, Dr. Mahesh Valentin have personally reviewed and agreed with the information entered by the Virtual Scribe. This visit was completed via telemedicine. All issues as below were discussed and addressed but no physical exam was performed unless allowed by visual confirmation. If it was felt that the patient should be evaluated in clinic, then they were directed there. Patient verbal consented to the visit. 12/02/24.    ASSESSMENT:  Problem List Items Addressed This Visit       Kidney stones    Relevant Medications    potassium citrate CR (Urocit-K-10) 10 mEq ER tablet    Other Relevant Orders    Urinalysis with Reflex Culture and Microscopic     Other Visit Diagnoses       Encounter for removal of ureteral stent        Relevant Medications    potassium citrate CR (Urocit-K-10) 10 mEq ER tablet             PLAN:  #Nephrolithiasis  Hx of obstructing left ureteral stones.   S/p left ULLS + stone extraction with Dr. Sherman. (09/02/24).  S/p left URS + stent with me. (09/15/24)  S/p stent removal with me. (09/30/24)  See HPI below.     Reports she has been doing well overall in the interim.   Renal ultrasound showed a persistent left non-obstructing stone (LUP; 0.9 cm).  Elects to continue stone surveillance and Urocit-K for prevention.   Previous urine pH 5.5, opts to perform UA at local lab.   RTC in 6 months for reassessment.     Discussion:  Patient was educated on stone prevention dietary modifications which include increased water intake, citric acid supplementation in the form of lemon juice, low oxalate diet, moderate protein intake and low sodium intake. In addition, suggested avoiding dark-colored sodas. A daily urine output of 2.5 L is also recommended if feasible.     #Incomplete bladder emptying  Denotes sensation of incomplete bladder emptying.   Alleviated with bending forward or applying pressure to bladder while voiding.   No very bothersome at this time.    Albeit, may consider referral to Uro-Gyn if worsens.     All questions were answered to the patient’s satisfaction.  Patient agrees with the plan and wishes to proceed.  Continue follow-up for ongoing care of her chronic medical conditions.       History of Present Illness (HPI):  Kristy presents for a follow up visit.  The patient’s EMR has been reviewed.  Lives in [place], [place].     Hx of nephrolithiasis.  Recently treated for an obstructing L ureteral stone + renal colic.   Underwent left ULLS + stone extraction with Dr. Sherman. (09/02/24).   Stent removed with Dr. Sherman. (09/10/24)   Returned to the ED for acute CP. (09/13/24)  CT negative for concerning cardiopulmonary pathology.   Albeit, showed an old 5 mm + new 4 mm left ureteral stone.   Urology consulted, seen by me while inpatient.   Underwent left URS + stent with me. (09/15/24)  Showed no ureteral stone; see below.  Noted significant stone dust in the bladder.   She felt better overall following URS+stent.   S/p cystoscopy + stent removal with me. (09/30/24)    OR Findings (09/15/24): Retrograde pyelogram showed no hydronephrosis or obvious filling defect. Contrast passed inconsistently in the proximal ureter consistent with possible stone there. Ultimately during ureteroscopy no ureteral stone was identified but there was significant stone dust in the bladder and ureter. 4 small 2-5mm stones were identified throughout the calices and were dusted.     TODAY: (12/02/24)  Presents virtually for follow up and ultrasound results.   Reports she has been doing well overall.   No stone-related symptoms or UTI in the interim.   States she continues Urocit-K for stone prevention.   Has been taking BID (morning and night) instead of TID.    From a urinary standpoint.   Denotes sensation of incomplete bladder emptying.   Alleviated with bending forward or applying pressure to bladder while voiding.   No very bothersome at this time.     Renal ultrasound  (11/16/24) reviewed.   1. Left superior pole non-obstructive calculus measuring 0.9 cm.     TO REVIEW: Initial visit (09/30/24)  Presents for cystoscopy + stent removal.   Reports she has been doing well overall.   Has minimal stent-related discomfort, not bothersome.   Denies any recent infections, fevers or chills.     Cystoscopy performed:   Kristy Correa identified using two (2) forms of identification.  Procedure: diagnostic cystourethroscopy  Indications for procedure: stent removal.   Risks, benefits, and alternatives were discussed in detail.   Patient appears to understand and agrees to proceed.   Patient has signed the procedure consent form.     Cystoscopy findings:  Urethra: normal course and caliber, no evidence of stricture or lesion.  Bladder: normal capacity, no trabeculations, no diverticulum, no stone, tumors or other lesions.  Ureteral stent visualized, grasped and removed in its entirety utilizing sterile technique.   Post-cystoscopy: Patient tolerated procedure without complications.    PMH: pAFIB, Rheumatoid arthritis, osteoarthritis, GERD, nephrolithiasis.   PSH: ULLS, cholecystectomy, colposcopy, tubal ligation, bariatric surgery.   FH: Denies FH of  malignancy.   SH: Non-smoker.     Past Medical History:   Diagnosis Date    Arthritis     COVID-19 06/17/2022    COVID-19 virus infection    GERD (gastroesophageal reflux disease)     Paroxysmal atrial fibrillation (Multi)     Paroxysmal atrial fibrillation    RA (rheumatoid arthritis) (Multi)      Past Surgical History:   Procedure Laterality Date    CYSTOSCOPY W/ LASER LITHOTRIPSY Left 09/02/2024    OTHER SURGICAL HISTORY  06/17/2022    Cholecystectomy    OTHER SURGICAL HISTORY  06/17/2022    Colposcopy    OTHER SURGICAL HISTORY  06/17/2022    Tubal ligation bilateral    OTHER SURGICAL HISTORY  06/17/2022    Complete colonoscopy    OTHER SURGICAL HISTORY  09/27/2022    Bariatric surgery    OTHER SURGICAL HISTORY  09/27/2022     Esophagogastroduodenoscopy     No family history on file.  Social History     Tobacco Use   Smoking Status Never    Passive exposure: Never   Smokeless Tobacco Never     Current Outpatient Medications   Medication Sig Dispense Refill    alendronate (Fosamax) 70 mg tablet Take 1 tablet (70 mg) by mouth every 7 days. Take in the morning with a full glass of water, on an empty stomach, and do not take anything else by mouth or lie down for the next 30 min.      B complex-vitamin C-folic acid (Nephro-La Rx) 1- mg-mg-mcg tablet Take 1 tablet by mouth once daily with breakfast.      cholecalciferol (Vitamin D-3) 5,000 Units tablet Take 1 tablet (5,000 Units) by mouth once daily.      furosemide (Lasix) 20 mg tablet Take 1 tablet (20 mg) by mouth once daily. (Patient taking differently: Take 2 tablets (40 mg) by mouth once daily.)      pantoprazole (ProtoNix) 20 mg EC tablet Take 2 tablets (40 mg) by mouth 2 times a day. Do not crush, chew, or split.      potassium citrate CR (Urocit-K-10) 10 mEq ER tablet Take 1 tablet (10 mEq) by mouth 3 times daily (morning, midday, late afternoon). Do not crush, chew, or split. 90 tablet 11    predniSONE (Deltasone) 10 mg tablet Take 1 tablet (10 mg) by mouth once daily.       No current facility-administered medications for this visit.     Allergies   Allergen Reactions    Hydrocortisone Rash     Patient had Cortizone injection in the left knee, rash was arms, legs, torso.  Itch, warm.    Fentanyl Other    Fexofenadine Unknown     vertigo    Hydromorphone Other    Methylprednisolone Unknown     Allergy to prednisone in the medrol dose pack    Tetanus Vaccines And Toxoid Swelling and Unknown    Azithromycin Rash and Unknown     Zpak     Past medical, surgical, family and social history in the chart was reviewed and is accurate including any additions to what is in this HPI.    REVIEW OF SYSTEMS (ROS):   Constitutional: denies any unintentional weight loss or change in  strength.  Integumentary: denies any rashes or pruritus.  Eyes: denies any double vision or eye pain.  Ear/Nose/Mouth/Throat: denies any nosebleeds or gum bleeds.  Cardiovascular: denies any chest pain or syncope.  Respiratory: denies hemoptysis.  Gastrointestinal: denies nausea or vomiting.  Musculoskeletal: denies muscle cramping or weakness.  Neurologic: denies convulsions or seizures.  Hematologic/Lymphatic: denies bleeding tendencies.  Endocrine: denies heat/cold intolerance.  All other systems have been reviewed and are negative unless otherwise noted in the HPI.     OBJECTIVE:  There were no vitals taken for this visit.    PHYSICAL EXAM:  LIMITED 2/2 BEING A TELEHEALTH VISIT.    LABS & IMAGING:  Lab Results   Component Value Date    WBC 7.0 09/15/2024    HGB 9.1 (L) 09/15/2024    HCT 31.5 (L) 09/15/2024     09/15/2024    CHOL 231 (H) 03/01/2024    TRIG 76 03/01/2024    HDL 74.4 03/01/2024    ALT 16 09/13/2024    AST 18 09/13/2024     09/20/2024    K 4.8 09/20/2024     09/20/2024    CREATININE 1.10 (H) 09/20/2024    BUN 23 09/20/2024    CO2 29 09/20/2024    TSH 1.75 03/01/2024    INR 1.1 09/13/2024    HGBA1C 5.3 03/01/2024     Scribed for Dr. Mahesh Valentin by Dagoberto Powell.  I, Dr. Mahesh Valentin have personally reviewed and agreed with the information entered by the Virtual Scribe. 12/02/24.

## 2024-12-06 ENCOUNTER — APPOINTMENT (OUTPATIENT)
Dept: LAB | Facility: LAB | Age: 64
End: 2024-12-06
Payer: COMMERCIAL

## 2024-12-27 ENCOUNTER — LAB (OUTPATIENT)
Dept: LAB | Facility: LAB | Age: 64
End: 2024-12-27
Payer: COMMERCIAL

## 2025-01-02 DIAGNOSIS — Z00.6 RESEARCH EXAM: ICD-10-CM

## 2025-01-24 ENCOUNTER — APPOINTMENT (OUTPATIENT)
Dept: NEUROLOGY | Facility: HOSPITAL | Age: 65
End: 2025-01-24

## 2025-03-14 ENCOUNTER — APPOINTMENT (OUTPATIENT)
Dept: LAB | Facility: LAB | Age: 65
End: 2025-03-14
Payer: COMMERCIAL

## 2025-03-14 ENCOUNTER — HOSPITAL ENCOUNTER (OUTPATIENT)
Dept: NEUROLOGY | Facility: HOSPITAL | Age: 65
Discharge: HOME | End: 2025-03-14
Payer: COMMERCIAL

## 2025-03-14 DIAGNOSIS — Z00.6 RESEARCH EXAM: ICD-10-CM

## 2025-03-14 PROCEDURE — 95911 NRV CNDJ TEST 9-10 STUDIES: CPT | Mod: GC | Performed by: PSYCHIATRY & NEUROLOGY

## 2025-03-14 PROCEDURE — 95886 MUSC TEST DONE W/N TEST COMP: CPT | Mod: GC | Performed by: PSYCHIATRY & NEUROLOGY

## 2025-06-05 ENCOUNTER — APPOINTMENT (OUTPATIENT)
Dept: NEUROLOGY | Facility: HOSPITAL | Age: 65
End: 2025-06-05
Payer: COMMERCIAL

## (undated) DEVICE — SOLUTION, IRRIGATION, USP, STERILE WATER, UROLOGICAL, 3000 ML, BAG

## (undated) DEVICE — GLOVE, SURGICAL, PROTEXIS PI , 7.5, PF, LF

## (undated) DEVICE — BASKET, STONE, RETRIEVAL, NITINOL (4WIRE, 1.9 0 TIP)

## (undated) DEVICE — HOLMIUM 200 FORTEC FIBER

## (undated) DEVICE — COVER, FOOTSWITCH, PEDAL, WIDE

## (undated) DEVICE — CUP, MEDICINE, GRADUATED, 2 OZ, PLASTIC, DISP, LF

## (undated) DEVICE — GOWN, SURGICAL, ROYAL SILK, LG, STERILE

## (undated) DEVICE — TUBING, SUCTION, 6MM X 10, CLEAN N-COND

## (undated) DEVICE — GOWN, SURGICAL, ROYAL SILK, XL, STERILE

## (undated) DEVICE — Device

## (undated) DEVICE — PROTECTOR, NERVE, ULNAR, PINK

## (undated) DEVICE — GUIDEWIRE, DUAL SENSOR, .035 X 150 STRAIGHT,  3CM

## (undated) DEVICE — DRAPE, LEGGINGS, 28.5 X 43 IN, DISPOSABLE, LF, STERILE

## (undated) DEVICE — HOLSTER, ELECTROSURGERY ACCESSORY, STERILE

## (undated) DEVICE — TRAY, SKIN SCRUB, WET PREP, WITH 4 COMPARMENT

## (undated) DEVICE — SHEATH, URETERAL ACCESS, NAVIGATOR 12/14FR X 28CM

## (undated) DEVICE — CATHETER, URETERAL, POLLACK, OPEN END, 5.5 FR, 70 CM

## (undated) DEVICE — CATHETER, DUAL LUMEN, UDC/10/50 M

## (undated) DEVICE — Y-ADAPTER, GATEWAY ADVANTAGE

## (undated) DEVICE — SOLUTION, INJECTION, USP, SODIUM CHLORIDE 0.9%, .9, NACL, 1000 ML, BAG

## (undated) DEVICE — CUFF, BP LARGE SOFT ADULT, DISPOSABLE

## (undated) DEVICE — DRAINBAG, 15.5 X 31, 2600/2800 UROVIEW, STERILE

## (undated) DEVICE — PRESSURE INFUSOR, 1000 CC, DISPOSABLE

## (undated) DEVICE — BOWL, BASIN, 32 OZ, STERILE

## (undated) DEVICE — SYRINGE, 10 CC, LUER LOCK

## (undated) DEVICE — TOWEL PACK, STERILE, 16X24, XRAY DETECTABLE, BLUE, 4/PK

## (undated) DEVICE — SOLUTION, IRRIGATION, STERILE WATER, 1000 ML, POUR BOTTLE

## (undated) DEVICE — GLOVE, SURGICAL, PROTEXIS PI , 7.0, PF, LF